# Patient Record
Sex: MALE | Race: ASIAN | Employment: OTHER | URBAN - METROPOLITAN AREA
[De-identification: names, ages, dates, MRNs, and addresses within clinical notes are randomized per-mention and may not be internally consistent; named-entity substitution may affect disease eponyms.]

---

## 2022-12-16 ENCOUNTER — APPOINTMENT (OUTPATIENT)
Dept: GENERAL RADIOLOGY | Facility: HOSPITAL | Age: 77
End: 2022-12-16
Attending: EMERGENCY MEDICINE
Payer: MEDICAID

## 2022-12-16 ENCOUNTER — APPOINTMENT (OUTPATIENT)
Dept: INTERVENTIONAL RADIOLOGY/VASCULAR | Facility: HOSPITAL | Age: 77
End: 2022-12-16
Attending: INTERNAL MEDICINE
Payer: MEDICAID

## 2022-12-16 ENCOUNTER — APPOINTMENT (OUTPATIENT)
Dept: CV DIAGNOSTICS | Facility: HOSPITAL | Age: 77
End: 2022-12-16
Attending: INTERNAL MEDICINE
Payer: MEDICAID

## 2022-12-16 ENCOUNTER — HOSPITAL ENCOUNTER (INPATIENT)
Facility: HOSPITAL | Age: 77
LOS: 2 days | Discharge: HOME | End: 2022-12-18
Attending: EMERGENCY MEDICINE | Admitting: HOSPITALIST
Payer: MEDICAID

## 2022-12-16 DIAGNOSIS — I21.3 ST ELEVATION MYOCARDIAL INFARCTION (STEMI), UNSPECIFIED ARTERY (HCC): ICD-10-CM

## 2022-12-16 DIAGNOSIS — I44.2 THIRD DEGREE HEART BLOCK (HCC): Primary | ICD-10-CM

## 2022-12-16 LAB
ALBUMIN SERPL-MCNC: 3.9 G/DL (ref 3.4–5)
ALBUMIN/GLOB SERPL: 1.1 {RATIO} (ref 1–2)
ALP LIVER SERPL-CCNC: 57 U/L
ALT SERPL-CCNC: 67 U/L
ANION GAP SERPL CALC-SCNC: 6 MMOL/L (ref 0–18)
APTT PPP: 28.8 SECONDS (ref 23.3–35.6)
AST SERPL-CCNC: 65 U/L (ref 15–37)
ATRIAL RATE: 60 BPM
ATRIAL RATE: 80 BPM
ATRIAL RATE: 92 BPM
BASOPHILS # BLD AUTO: 0.06 X10(3) UL (ref 0–0.2)
BASOPHILS NFR BLD AUTO: 0.5 %
BILIRUB SERPL-MCNC: 0.3 MG/DL (ref 0.1–2)
BUN BLD-MCNC: 20 MG/DL (ref 7–18)
CALCIUM BLD-MCNC: 9 MG/DL (ref 8.5–10.1)
CHLORIDE SERPL-SCNC: 107 MMOL/L (ref 98–112)
CHOLEST SERPL-MCNC: 210 MG/DL (ref ?–200)
CO2 SERPL-SCNC: 27 MMOL/L (ref 21–32)
CREAT BLD-MCNC: 1.4 MG/DL
EOSINOPHIL # BLD AUTO: 0.29 X10(3) UL (ref 0–0.7)
EOSINOPHIL NFR BLD AUTO: 2.3 %
ERYTHROCYTE [DISTWIDTH] IN BLOOD BY AUTOMATED COUNT: 13.2 %
EST. AVERAGE GLUCOSE BLD GHB EST-MCNC: 134 MG/DL (ref 68–126)
GFR SERPLBLD BASED ON 1.73 SQ M-ARVRAT: 52 ML/MIN/1.73M2 (ref 60–?)
GLOBULIN PLAS-MCNC: 3.7 G/DL (ref 2.8–4.4)
GLUCOSE BLD-MCNC: 111 MG/DL (ref 70–99)
GLUCOSE BLD-MCNC: 186 MG/DL (ref 70–99)
GLUCOSE BLD-MCNC: 94 MG/DL (ref 70–99)
GLUCOSE BLD-MCNC: 98 MG/DL (ref 70–99)
HBA1C MFR BLD: 6.3 % (ref ?–5.7)
HCT VFR BLD AUTO: 41.7 %
HDLC SERPL-MCNC: 34 MG/DL (ref 40–59)
HGB BLD-MCNC: 13.1 G/DL
IMM GRANULOCYTES # BLD AUTO: 0.03 X10(3) UL (ref 0–1)
IMM GRANULOCYTES NFR BLD: 0.2 %
ISTAT ACTIVATED CLOTTING TIME: 257 SECONDS (ref 74–137)
LDLC SERPL CALC-MCNC: 146 MG/DL (ref ?–100)
LYMPHOCYTES # BLD AUTO: 4.12 X10(3) UL (ref 1–4)
LYMPHOCYTES NFR BLD AUTO: 32.6 %
MAGNESIUM SERPL-MCNC: 2.4 MG/DL (ref 1.6–2.6)
MCH RBC QN AUTO: 28.3 PG (ref 26–34)
MCHC RBC AUTO-ENTMCNC: 31.4 G/DL (ref 31–37)
MCV RBC AUTO: 90.1 FL
MONOCYTES # BLD AUTO: 0.77 X10(3) UL (ref 0.1–1)
MONOCYTES NFR BLD AUTO: 6.1 %
NEUTROPHILS # BLD AUTO: 7.38 X10 (3) UL (ref 1.5–7.7)
NEUTROPHILS # BLD AUTO: 7.38 X10(3) UL (ref 1.5–7.7)
NEUTROPHILS NFR BLD AUTO: 58.3 %
NONHDLC SERPL-MCNC: 176 MG/DL (ref ?–130)
NT-PROBNP SERPL-MCNC: 185 PG/ML (ref ?–450)
OSMOLALITY SERPL CALC.SUM OF ELEC: 297 MOSM/KG (ref 275–295)
P AXIS: 23 DEGREES
P AXIS: 56 DEGREES
P AXIS: 6 DEGREES
P-R INTERVAL: 292 MS
P-R INTERVAL: 378 MS
PLATELET # BLD AUTO: 236 10(3)UL (ref 150–450)
POTASSIUM SERPL-SCNC: 3.8 MMOL/L (ref 3.5–5.1)
PROT SERPL-MCNC: 7.6 G/DL (ref 6.4–8.2)
Q-T INTERVAL: 374 MS
Q-T INTERVAL: 418 MS
Q-T INTERVAL: 508 MS
QRS DURATION: 86 MS
QRS DURATION: 94 MS
QRS DURATION: 96 MS
QTC CALCULATION (BEZET): 387 MS
QTC CALCULATION (BEZET): 418 MS
QTC CALCULATION (BEZET): 431 MS
R AXIS: 17 DEGREES
R AXIS: 42 DEGREES
R AXIS: 58 DEGREES
RBC # BLD AUTO: 4.63 X10(6)UL
SARS-COV-2 RNA RESP QL NAA+PROBE: NOT DETECTED
SODIUM SERPL-SCNC: 140 MMOL/L (ref 136–145)
T AXIS: 114 DEGREES
T AXIS: 4 DEGREES
T AXIS: 80 DEGREES
TRIGL SERPL-MCNC: 166 MG/DL (ref 30–149)
TROPONIN I HIGH SENSITIVITY: 906 NG/L
VENTRICULAR RATE: 35 BPM
VENTRICULAR RATE: 60 BPM
VENTRICULAR RATE: 80 BPM
VLDLC SERPL CALC-MCNC: 31 MG/DL (ref 0–30)
WBC # BLD AUTO: 12.7 X10(3) UL (ref 4–11)

## 2022-12-16 PROCEDURE — B211YZZ FLUOROSCOPY OF MULTIPLE CORONARY ARTERIES USING OTHER CONTRAST: ICD-10-PCS | Performed by: INTERNAL MEDICINE

## 2022-12-16 PROCEDURE — 4A023N7 MEASUREMENT OF CARDIAC SAMPLING AND PRESSURE, LEFT HEART, PERCUTANEOUS APPROACH: ICD-10-PCS | Performed by: INTERNAL MEDICINE

## 2022-12-16 PROCEDURE — 93306 TTE W/DOPPLER COMPLETE: CPT | Performed by: INTERNAL MEDICINE

## 2022-12-16 PROCEDURE — 5A1223Z PERFORMANCE OF CARDIAC PACING, CONTINUOUS: ICD-10-PCS | Performed by: INTERNAL MEDICINE

## 2022-12-16 PROCEDURE — B41FYZZ FLUOROSCOPY OF RIGHT LOWER EXTREMITY ARTERIES USING OTHER CONTRAST: ICD-10-PCS | Performed by: INTERNAL MEDICINE

## 2022-12-16 PROCEDURE — 3E033XZ INTRODUCTION OF VASOPRESSOR INTO PERIPHERAL VEIN, PERCUTANEOUS APPROACH: ICD-10-PCS | Performed by: INTERNAL MEDICINE

## 2022-12-16 PROCEDURE — B240ZZ3 ULTRASONOGRAPHY OF SINGLE CORONARY ARTERY, INTRAVASCULAR: ICD-10-PCS | Performed by: INTERNAL MEDICINE

## 2022-12-16 PROCEDURE — 99223 1ST HOSP IP/OBS HIGH 75: CPT | Performed by: HOSPITALIST

## 2022-12-16 PROCEDURE — 027034Z DILATION OF CORONARY ARTERY, ONE ARTERY WITH DRUG-ELUTING INTRALUMINAL DEVICE, PERCUTANEOUS APPROACH: ICD-10-PCS | Performed by: INTERNAL MEDICINE

## 2022-12-16 PROCEDURE — 3E033PZ INTRODUCTION OF PLATELET INHIBITOR INTO PERIPHERAL VEIN, PERCUTANEOUS APPROACH: ICD-10-PCS | Performed by: INTERNAL MEDICINE

## 2022-12-16 PROCEDURE — 3E073GC INTRODUCTION OF OTHER THERAPEUTIC SUBSTANCE INTO CORONARY ARTERY, PERCUTANEOUS APPROACH: ICD-10-PCS | Performed by: INTERNAL MEDICINE

## 2022-12-16 PROCEDURE — 71045 X-RAY EXAM CHEST 1 VIEW: CPT | Performed by: EMERGENCY MEDICINE

## 2022-12-16 PROCEDURE — 02C03ZZ EXTIRPATION OF MATTER FROM CORONARY ARTERY, ONE ARTERY, PERCUTANEOUS APPROACH: ICD-10-PCS | Performed by: INTERNAL MEDICINE

## 2022-12-16 RX ORDER — ASPIRIN 81 MG/1
81 TABLET ORAL DAILY
Status: DISCONTINUED | OUTPATIENT
Start: 2022-12-17 | End: 2022-12-18

## 2022-12-16 RX ORDER — HEPARIN SODIUM 5000 [USP'U]/ML
INJECTION, SOLUTION INTRAVENOUS; SUBCUTANEOUS
Status: COMPLETED
Start: 2022-12-16 | End: 2022-12-16

## 2022-12-16 RX ORDER — MORPHINE SULFATE 2 MG/ML
1 INJECTION, SOLUTION INTRAMUSCULAR; INTRAVENOUS EVERY 2 HOUR PRN
Status: DISCONTINUED | OUTPATIENT
Start: 2022-12-16 | End: 2022-12-18

## 2022-12-16 RX ORDER — ATORVASTATIN CALCIUM 20 MG/1
20 TABLET, FILM COATED ORAL NIGHTLY
COMMUNITY
End: 2022-12-18

## 2022-12-16 RX ORDER — NICOTINE POLACRILEX 4 MG
15 LOZENGE BUCCAL
Status: DISCONTINUED | OUTPATIENT
Start: 2022-12-16 | End: 2022-12-18

## 2022-12-16 RX ORDER — NICOTINE POLACRILEX 4 MG
30 LOZENGE BUCCAL
Status: DISCONTINUED | OUTPATIENT
Start: 2022-12-16 | End: 2022-12-18

## 2022-12-16 RX ORDER — ASPIRIN 81 MG/1
324 TABLET, CHEWABLE ORAL ONCE
Status: COMPLETED | OUTPATIENT
Start: 2022-12-16 | End: 2022-12-16

## 2022-12-16 RX ORDER — MORPHINE SULFATE 4 MG/ML
4 INJECTION, SOLUTION INTRAMUSCULAR; INTRAVENOUS EVERY 2 HOUR PRN
Status: DISCONTINUED | OUTPATIENT
Start: 2022-12-16 | End: 2022-12-18

## 2022-12-16 RX ORDER — POTASSIUM CHLORIDE 20 MEQ/1
40 TABLET, EXTENDED RELEASE ORAL ONCE
Status: COMPLETED | OUTPATIENT
Start: 2022-12-16 | End: 2022-12-16

## 2022-12-16 RX ORDER — DEXTROSE MONOHYDRATE 25 G/50ML
50 INJECTION, SOLUTION INTRAVENOUS
Status: DISCONTINUED | OUTPATIENT
Start: 2022-12-16 | End: 2022-12-18

## 2022-12-16 RX ORDER — MELATONIN
3 NIGHTLY PRN
Status: DISCONTINUED | OUTPATIENT
Start: 2022-12-16 | End: 2022-12-18

## 2022-12-16 RX ORDER — LIDOCAINE HYDROCHLORIDE 10 MG/ML
INJECTION, SOLUTION EPIDURAL; INFILTRATION; INTRACAUDAL; PERINEURAL
Status: COMPLETED
Start: 2022-12-16 | End: 2022-12-16

## 2022-12-16 RX ORDER — ADENOSINE 3 MG/ML
INJECTION, SOLUTION INTRAVENOUS
Status: COMPLETED
Start: 2022-12-16 | End: 2022-12-16

## 2022-12-16 RX ORDER — ACETAMINOPHEN 500 MG
500 TABLET ORAL EVERY 6 HOURS PRN
COMMUNITY

## 2022-12-16 RX ORDER — HEPARIN SODIUM 5000 [USP'U]/ML
5000 INJECTION, SOLUTION INTRAVENOUS; SUBCUTANEOUS EVERY 12 HOURS SCHEDULED
Status: DISCONTINUED | OUTPATIENT
Start: 2022-12-16 | End: 2022-12-18

## 2022-12-16 RX ORDER — WATER 1000 ML/1000ML
INJECTION, SOLUTION INTRAVENOUS
Status: COMPLETED
Start: 2022-12-16 | End: 2022-12-16

## 2022-12-16 RX ORDER — HEPARIN SODIUM AND DEXTROSE 10000; 5 [USP'U]/100ML; G/100ML
12 INJECTION INTRAVENOUS ONCE
Status: COMPLETED | OUTPATIENT
Start: 2022-12-16 | End: 2022-12-16

## 2022-12-16 RX ORDER — HEPARIN SODIUM AND DEXTROSE 10000; 5 [USP'U]/100ML; G/100ML
INJECTION INTRAVENOUS CONTINUOUS
Status: DISCONTINUED | OUTPATIENT
Start: 2022-12-16 | End: 2022-12-16 | Stop reason: ALTCHOICE

## 2022-12-16 RX ORDER — MIDAZOLAM HYDROCHLORIDE 1 MG/ML
INJECTION INTRAMUSCULAR; INTRAVENOUS
Status: COMPLETED
Start: 2022-12-16 | End: 2022-12-16

## 2022-12-16 RX ORDER — ATORVASTATIN CALCIUM 40 MG/1
40 TABLET, FILM COATED ORAL NIGHTLY
Status: DISCONTINUED | OUTPATIENT
Start: 2022-12-16 | End: 2022-12-18

## 2022-12-16 RX ORDER — ASPIRIN 81 MG/1
81 TABLET ORAL DAILY
COMMUNITY

## 2022-12-16 RX ORDER — NOREPINEPHRINE BITARTRATE 1 MG/ML
INJECTION, SOLUTION INTRAVENOUS
Status: COMPLETED
Start: 2022-12-16 | End: 2022-12-16

## 2022-12-16 RX ORDER — METOCLOPRAMIDE HYDROCHLORIDE 5 MG/ML
10 INJECTION INTRAMUSCULAR; INTRAVENOUS EVERY 8 HOURS PRN
Status: DISCONTINUED | OUTPATIENT
Start: 2022-12-16 | End: 2022-12-18

## 2022-12-16 RX ORDER — ONDANSETRON 2 MG/ML
4 INJECTION INTRAMUSCULAR; INTRAVENOUS EVERY 6 HOURS PRN
Status: DISCONTINUED | OUTPATIENT
Start: 2022-12-16 | End: 2022-12-18

## 2022-12-16 RX ORDER — IODIXANOL 320 MG/ML
100 INJECTION, SOLUTION INTRAVASCULAR
Status: COMPLETED | OUTPATIENT
Start: 2022-12-16 | End: 2022-12-16

## 2022-12-16 RX ORDER — HEPARIN SODIUM 1000 [USP'U]/ML
60 INJECTION, SOLUTION INTRAVENOUS; SUBCUTANEOUS ONCE
Status: COMPLETED | OUTPATIENT
Start: 2022-12-16 | End: 2022-12-16

## 2022-12-16 RX ORDER — SODIUM CHLORIDE 9 MG/ML
INJECTION, SOLUTION INTRAVENOUS CONTINUOUS
Status: DISCONTINUED | OUTPATIENT
Start: 2022-12-16 | End: 2022-12-16

## 2022-12-16 RX ORDER — MORPHINE SULFATE 2 MG/ML
2 INJECTION, SOLUTION INTRAMUSCULAR; INTRAVENOUS EVERY 2 HOUR PRN
Status: DISCONTINUED | OUTPATIENT
Start: 2022-12-16 | End: 2022-12-18

## 2022-12-16 RX ORDER — SODIUM CHLORIDE 9 MG/ML
INJECTION, SOLUTION INTRAVENOUS CONTINUOUS
Status: ACTIVE | OUTPATIENT
Start: 2022-12-16 | End: 2022-12-16

## 2022-12-16 RX ORDER — ACETAMINOPHEN 500 MG
500 TABLET ORAL EVERY 4 HOURS PRN
Status: DISCONTINUED | OUTPATIENT
Start: 2022-12-16 | End: 2022-12-18

## 2022-12-16 RX ORDER — NITROGLYCERIN 20 MG/100ML
INJECTION INTRAVENOUS
Status: COMPLETED
Start: 2022-12-16 | End: 2022-12-16

## 2022-12-16 NOTE — PROGRESS NOTES
12/16/22 1000   Clinical Encounter Type   Visited With Health care provider;Patient and family together   Routine Visit Introduction   Continue Visiting Yes  (come back later in day)   Patient's Supportive Strategies/Resources Family   Patient Spiritual Encounters   Spiritual Assessment Completed No   Family Spiritual Encounters   Family Coping Accepting   Family Participation in [place] [address] During Treatment Consistently   Taxonomy   Intended Effects Aligning care plan with patient's values   Methods Collaborate with care team member;Offer support   Interventions Acknowledge current situation; Acknowledge response to difficult experience; Ask guided questions;Assist someone with Advance Directives     Discussion:   received [place] Consult for a POA/Living Will. Prior to visit, reviewed the patient's EMR and paper chart to identify any existing POA documentation. No documentation was found.  consulted patient's RN Sami Mortensen) who verified patient is decisional. RN reported that patient speaks minimal English.  met with patient and daughter. Patient seemed tired.  explained POA paperwork to patient's daughter who asked if  would return later in day after patient's son arrives. Translation will be required to complete POA with patient. Spiritual Care can be requested via an Epic consult.      July Mock M.Div, Chaplain Resident  Ext. [phone]

## 2022-12-16 NOTE — PROGRESS NOTES
12/16/22 2535   Clinical Encounter Type   Visited With Family   Routine Visit Follow-up     Discussion:   followed up with patient's family. Patient was resting and daughter was present. She said that the rest of her family had not arrived yet.  was unable to complete POA at this time. When the family has time discuss POA with the patient, Spiritual Care can be paged (ext. [phone]) to finish the paperwork. Spiritual Care support can be requested via an [facility] consult.      Margie Hager M.Div, Chaplain Resident  Ext. [phone]

## 2022-12-16 NOTE — PROGRESS NOTES
12/16/22 0824   Clinical Encounter Type   Visited With Patient   Taxonomy   Intended Effects Promote a sense of peace   Methods Offer support   Interventions Silent prayer; Acknowledge current situation      attempted visit with patient. Patient in bed sleeping. Staff at bedside.  remains available for ongoing support. Spiritual Care can be requested via an [facility] consult or by pager at [address] [place] [address]. JOSÉ ANTONIO Mujica Div  Extension: [phone]

## 2022-12-16 NOTE — ED INITIAL ASSESSMENT (HPI)
79YM c/c of fall Pt family state that reports that they heard a fall Pt sate that he was heading to the bathroom and fall Pt state that he feeling weak and vomited

## 2022-12-16 NOTE — PROCEDURES
[facility]    Cardiac Cath Procedure Note    [place]  Patient Status:  Emergency    10/8/1945 MRN [id number]   Location [address] [facility] Attending Adrian Saenz MD   Hosp Day # 0 PCP Unknown Pcp       Cardiologist: Xin Parker MD  Primary Proceduralist: Xin Parker MD  Procedure Performed: LHC and IVUS, mechanical thrombectomy and PCI circumflex. Temporary placement  Date of Procedure: 2022   Indication: ST elevation MI    Summary of procedure:    Successful aspiration thrombectomy, PCI, IVUS dominant circumflex with ANNA x1. Temporary pacemaking wire placed      Left Ventriculography and hemodynamics:   Aortic valve not crossed      Coronary Angiography  RCA: Nondominant, small, supplies small RV marginal branches. Left main:  Free of obstructive disease    Left anterior descending: Mid LAD stent with moderate in-stent restenosis, 30 to 40%. Diagonal with nonobstructive disease as well. Circumflex: Dominant system, proximal hazy 99% stenosis with LUPE I flow. Supplies lateral wall marginal as well as inferior and inferolateral branches. Ramus: Large ramus, nonobstructive      Circumflex intervention  Lesion Characteristics-not torturous, not calcified. Type non-C lesion. Pre-intervention stenosis 99%, Post intervention stenosis 0%. Pre LUPE 1, Post LUPE 3.      Guide Catheter: EBU 3.75  Wire: Run through   Pre-dil: 3 x 20 balloon  Stent: 3.5 x 20 mm Synergy ANNA  No reflow, 100% thrombosis just distal to the stent, complete heart block and bradycardia and hypotension.   IV fluids opened up and Levophed started  Penumbra aspiration thrombectomy, multiple runs  Perforated 3 mm balloon to deliver 200 mcg nitroglycerin intracoronary distal to the stenosis with improvement in distal runoff and flow  Intravascular ultrasound demonstrates mildly underexpanded stent, layering thrombus  Penumbra aspiration thrombectomy repeated  Post-dil: 4 x 15 mm compliant balloon 14 abdirahman  No reflow after post dilation  Perforated infusion balloon advanced back into the distal circumflex, 300 mcg intracoronary nitroglycerin given with improvement in distal runoff    Assessment:  ST elevation MI: Status post PCI circumflex, resulting hypotension and heart block mildly improving to the end of the case. CAD: Status post PCI circumflex as above, old LAD stent with moderate in-stent restenosis but nonobstructive. Unknown EF: LV gram not done due to CKD, will have echo this morning    Hypertension, hyperlipidemia      Recommendations:  Echocardiogram ASAP this morning  [place]or bridge to Brilinta  Wean Levophed as tolerated  Temporary pacemaking wire placed, continue telemetry monitoring likely removal later today  High-dose statin      Description of Procedure:   After written informed consent was obtained from the patient, patient was brought to the cardiac catheterization laboratory. Patient was prepped and draped in the usual sterile fashion. Lidocaine 1% was used to infiltrate the right groin for local anesthesia and a 6 [place]an introducer sheath was inserted into the right femoral artery via ultrasound guidance and micropuncture kit. 7 [place]an sheath placed in the right femoral vein for temporary pacemaking wire, pacemaking wire advanced under fluoroscopic guidance, thresholds tested and sheath and wire secured to the groin. Selective coronary angiography performed with JR4 catheter for RCA and JL4 catheter for LCA. Angiography performed in standard projections. Selective right femoral angiogram done assess anatomy for closure. Specimen sent to: No specimen collected  Estimated blood loss: 10 cc  Closure:  Perclose       IV was maintained by RN and moderate conscious sedation of versed and fentanyl was given. Patient was assessed and monitoring of oxygen, heart rate and blood pressure by nurse and myself during the exam 60 minutes.       Yunior So MD  12/16/22

## 2022-12-16 NOTE — PLAN OF CARE
Received patient from cath lab at approximately [facility] 20. Right groin venous sheath in place with temporary pacer. Arterial sheath removed & perclosed. Site soft & intact. Pedal pulse palpable. No complaints of chest pain. Received on 10mcg of levophed able to wean to 4mcg this morning. Patient and family at bedside updated on plan of care.

## 2022-12-16 NOTE — CM/SW NOTE
CM notified by RN that pt's daughter, Salvador Zarate ([phone]), was interested in speaking with a CM/SW regarding insurance. Call placed to daughter and spent time answering her questions. Daughter appreciative of time and information. CM/SW to remain available for discharge planning.     JAGRUTI SandovalN, RN-BC    [id number]

## 2022-12-16 NOTE — PROGRESS NOTES
Seen and examined. Films reviewed. Reviewed with Dr. Kathia Westbrook - discussed with nursing staff, patient and family    Looks and feels well    Tele sinus - no evidence of heart block    Breathing comfortably supine    100/63  68 regular - weaning IV levophed    Lungs clear  Ht RRR - soft LESLIE  abd soft  Ext no bleeding from groin site - temp PM in place via right CFV  Neuro intact    ECG pending    Echo pending      A/P: Day #0 s/p Acute inferolateral infarction (occlusion of dominant LCX) - s/p PTCA/ANNA - presentation with symptomatic complete heart block and cardiogenic shock - now seemingly resolved    IV fluids for additional 10 hours    Plan to remove temporary pacer at 1:00 pm if no further heart block    ECG now    Continue to wean levophed    Aspirin/Brillinta/statin alone for now    May be up to chair later today if all goes well.       CCT:  8:50 - 9:40 am

## 2022-12-16 NOTE — DIETARY NOTE
Clinical Nutrition     Dietitian consult received per cardiac rehab standing order. Pt to be educated by cardiac rehab staff and encouraged to attend outpatient classes taught by ANGELA. RD available PRN.     Jose Luis Fair RD, LDN, [address]   Clinical Dietitian  Phone [phone]

## 2022-12-17 LAB
ALBUMIN SERPL-MCNC: 2.9 G/DL (ref 3.4–5)
ALBUMIN/GLOB SERPL: 0.9 {RATIO} (ref 1–2)
ALP LIVER SERPL-CCNC: 46 U/L
ALT SERPL-CCNC: 69 U/L
ANION GAP SERPL CALC-SCNC: 7 MMOL/L (ref 0–18)
AST SERPL-CCNC: 219 U/L (ref 15–37)
BASOPHILS # BLD AUTO: 0.03 X10(3) UL (ref 0–0.2)
BASOPHILS NFR BLD AUTO: 0.4 %
BILIRUB SERPL-MCNC: 0.7 MG/DL (ref 0.1–2)
BUN BLD-MCNC: 16 MG/DL (ref 7–18)
CALCIUM BLD-MCNC: 8.4 MG/DL (ref 8.5–10.1)
CHLORIDE SERPL-SCNC: 113 MMOL/L (ref 98–112)
CO2 SERPL-SCNC: 22 MMOL/L (ref 21–32)
CREAT BLD-MCNC: 1.04 MG/DL
EOSINOPHIL # BLD AUTO: 0.1 X10(3) UL (ref 0–0.7)
EOSINOPHIL NFR BLD AUTO: 1.2 %
ERYTHROCYTE [DISTWIDTH] IN BLOOD BY AUTOMATED COUNT: 13.3 %
GFR SERPLBLD BASED ON 1.73 SQ M-ARVRAT: 74 ML/MIN/1.73M2 (ref 60–?)
GLOBULIN PLAS-MCNC: 3.3 G/DL (ref 2.8–4.4)
GLUCOSE BLD-MCNC: 105 MG/DL (ref 70–99)
GLUCOSE BLD-MCNC: 109 MG/DL (ref 70–99)
GLUCOSE BLD-MCNC: 113 MG/DL (ref 70–99)
HCT VFR BLD AUTO: 36.5 %
HGB BLD-MCNC: 11.5 G/DL
IMM GRANULOCYTES # BLD AUTO: 0.03 X10(3) UL (ref 0–1)
IMM GRANULOCYTES NFR BLD: 0.4 %
LYMPHOCYTES # BLD AUTO: 2.4 X10(3) UL (ref 1–4)
LYMPHOCYTES NFR BLD AUTO: 28.3 %
MAGNESIUM SERPL-MCNC: 2.1 MG/DL (ref 1.6–2.6)
MCH RBC QN AUTO: 28.1 PG (ref 26–34)
MCHC RBC AUTO-ENTMCNC: 31.5 G/DL (ref 31–37)
MCV RBC AUTO: 89.2 FL
MONOCYTES # BLD AUTO: 0.87 X10(3) UL (ref 0.1–1)
MONOCYTES NFR BLD AUTO: 10.3 %
NEUTROPHILS # BLD AUTO: 5.04 X10 (3) UL (ref 1.5–7.7)
NEUTROPHILS # BLD AUTO: 5.04 X10(3) UL (ref 1.5–7.7)
NEUTROPHILS NFR BLD AUTO: 59.4 %
OSMOLALITY SERPL CALC.SUM OF ELEC: 296 MOSM/KG (ref 275–295)
PLATELET # BLD AUTO: 196 10(3)UL (ref 150–450)
POTASSIUM SERPL-SCNC: 4.2 MMOL/L (ref 3.5–5.1)
POTASSIUM SERPL-SCNC: 4.2 MMOL/L (ref 3.5–5.1)
PROT SERPL-MCNC: 6.2 G/DL (ref 6.4–8.2)
RBC # BLD AUTO: 4.09 X10(6)UL
SODIUM SERPL-SCNC: 142 MMOL/L (ref 136–145)
TROPONIN I HIGH SENSITIVITY: ABNORMAL NG/L
WBC # BLD AUTO: 8.5 X10(3) UL (ref 4–11)

## 2022-12-17 PROCEDURE — 99232 SBSQ HOSP IP/OBS MODERATE 35: CPT | Performed by: HOSPITALIST

## 2022-12-17 NOTE — CARDIAC REHAB
MI/CAD/stent education completed with pt and his son as well as his daughter via [employer]. Offered/encouraged phase 2 CR; contact info for [facility] given to pt.

## 2022-12-17 NOTE — DISCHARGE INSTRUCTIONS
Call soon to get scheduled for phase 2 Cardiac Rehab, the outpatient monitored exercise program.  You will need to follow up with your cardiologist and obtain an order for phase 2 Cardiac Rehab.    Tyler [facility] Cardiac Rehab dept: [phone]

## 2022-12-17 NOTE — PLAN OF CARE
Pt is admitted w/MI, s/p stent/plasty to Lt Cx. Pt rec'd with temp pacing wire via right fem vein. Back up rate at 40. Levophed infusing at 4mcg/min. Order rec'd to remove venous sheath/pacer. This is done at 1315. Pt remains in NSR-SB w/1st degree AVB. Levophed weaned to 2mcg/min, unable to completely wean off. Pt able to get out of bed and ambulate in hallway. Tolerates fairly well. Feels short of breath w/walk but sats 100%. Pt's family at bedside, updated on pt status and POC.

## 2022-12-17 NOTE — PLAN OF CARE
Received pt at approx 1530. Pt is A&Ox4, no complaints of pain. Pt is on room air, lungs are clear/diminished bilaterally, no coughing. Pt is in NSR, 1 degree AV block, no complaints of CP. He is continent of B&B, active bowel sounds, abdomen non-tender, last BM 12-16. Pt has a R groin incision, dressing in place, soft, no hematoma/drainage. Pt updated with plan of care. Problem: Patient/Family Goals  Goal: Patient/Family Long Term Goal  Description: Patient's Long Term Goal: get better 12-17    Interventions:  - med compliance  - follow ups    - See additional Care Plan goals for specific interventions  Outcome: Progressing  Goal: Patient/Family Short Term Goal  Description: Patient's Short Term Goal: no pain 12-17    Interventions:   - PRN meds  - hot/cold packs  - tell nurse if in pain    - See additional Care Plan goals for specific interventions  Outcome: Progressing     Problem: CARDIOVASCULAR - ADULT  Goal: Maintains optimal cardiac output and hemodynamic stability  Description: INTERVENTIONS:  - Monitor vital signs, rhythm, and trends  - Monitor for bleeding, hypotension and signs of decreased cardiac output  - Evaluate effectiveness of vasoactive medications to optimize hemodynamic stability  - Monitor arterial and/or venous puncture sites for bleeding and/or hematoma  - Assess quality of pulses, skin color and temperature  - Assess for signs of decreased coronary artery perfusion - ex.  Angina  - Evaluate fluid balance, assess for edema, trend weights  Outcome: Progressing  Goal: Absence of cardiac arrhythmias or at baseline  Description: INTERVENTIONS:  - Continuous cardiac monitoring, monitor vital signs, obtain 12 lead EKG if indicated  - Evaluate effectiveness of antiarrhythmic and heart rate control medications as ordered  - Initiate emergency measures for life threatening arrhythmias  - Monitor electrolytes and administer replacement therapy as ordered  Outcome: Progressing     Problem: Diabetes/Glucose Control  Goal: Glucose maintained within prescribed range  Description: INTERVENTIONS:  - Monitor Blood Glucose as ordered  - Assess for signs and symptoms of hyperglycemia and hypoglycemia  - Administer ordered medications to maintain glucose within target range  - Assess barriers to adequate nutritional intake and initiate nutrition consult as needed  - Instruct patient on self management of diabetes  Outcome: Progressing

## 2022-12-17 NOTE — PLAN OF CARE
NURSING NOTES [id number]:  0800: Pt received AOx4. Room air. SB. Saline lock. Pt's son at bedside. Denies pain. 1200: Pt ambulated in the hallway by PT and OT. Rehab person came. Pt tired and sleepy, pt stated he did not sleep well last night. 1400: Pt has no appetite and only had a banana. BS-113. VSS. BP stable. 1450: Report given to [facility]. Pt will transfer to [address] [place] [address] 2607. Pt's family awared. 1515: Pt transferred to room 2607 with all his belongings. Problem: CARDIOVASCULAR - ADULT  Goal: Maintains optimal cardiac output and hemodynamic stability  Description: INTERVENTIONS:  - Monitor vital signs, rhythm, and trends  - Monitor for bleeding, hypotension and signs of decreased cardiac output  - Evaluate effectiveness of vasoactive medications to optimize hemodynamic stability  - Monitor arterial and/or venous puncture sites for bleeding and/or hematoma  - Assess quality of pulses, skin color and temperature  - Assess for signs of decreased coronary artery perfusion - ex.  Angina  - Evaluate fluid balance, assess for edema, trend weights  Outcome: Progressing  Goal: Absence of cardiac arrhythmias or at baseline  Description: INTERVENTIONS:  - Continuous cardiac monitoring, monitor vital signs, obtain 12 lead EKG if indicated  - Evaluate effectiveness of antiarrhythmic and heart rate control medications as ordered  - Initiate emergency measures for life threatening arrhythmias  - Monitor electrolytes and administer replacement therapy as ordered  Outcome: Progressing     Problem: Diabetes/Glucose Control  Goal: Glucose maintained within prescribed range  Description: INTERVENTIONS:  - Monitor Blood Glucose as ordered  - Assess for signs and symptoms of hyperglycemia and hypoglycemia  - Administer ordered medications to maintain glucose within target range  - Assess barriers to adequate nutritional intake and initiate nutrition consult as needed  - Instruct patient on self management of diabetes  Outcome: Progressing

## 2022-12-17 NOTE — PLAN OF CARE
Pt. Remains alert and oriented x4, VSS on RA. Levophed gtt off and BP within paramters. Pt. Denies any pain. Right groin remains clean, dry, and intact with no hematoma and palpable pedal pulses. Pt. Able to ambulate to and from bathroom without complication. Pt. Up in chair this morning. Son at bedside overnight, participating in care.

## 2022-12-18 VITALS
DIASTOLIC BLOOD PRESSURE: 48 MMHG | HEIGHT: 71 IN | BODY MASS INDEX: 23.42 KG/M2 | SYSTOLIC BLOOD PRESSURE: 89 MMHG | HEART RATE: 69 BPM | TEMPERATURE: 99 F | WEIGHT: 167.31 LBS | RESPIRATION RATE: 18 BRPM | OXYGEN SATURATION: 100 %

## 2022-12-18 LAB
GLUCOSE BLD-MCNC: 105 MG/DL (ref 70–99)
GLUCOSE BLD-MCNC: 129 MG/DL (ref 70–99)
GLUCOSE BLD-MCNC: 94 MG/DL (ref 70–99)

## 2022-12-18 PROCEDURE — 99239 HOSP IP/OBS DSCHRG MGMT >30: CPT | Performed by: HOSPITALIST

## 2022-12-18 RX ORDER — ATORVASTATIN CALCIUM 40 MG/1
40 TABLET, FILM COATED ORAL NIGHTLY
Qty: 30 TABLET | Refills: 3 | Status: SHIPPED
Start: 2022-12-18

## 2022-12-18 RX ORDER — ATORVASTATIN CALCIUM 40 MG/1
40 TABLET, FILM COATED ORAL NIGHTLY
Qty: 30 TABLET | Refills: 3 | Status: SHIPPED
Start: 2022-12-18 | End: 2022-12-18

## 2022-12-18 NOTE — PLAN OF CARE
Received pt at approx 0700. Pt is A&Ox4, no complaints of pain. Pt is on room air, lungs are clear/diminished bilaterally, no coughing. Pt is in NSR, 1 degree AV block, no complaints of CP. He is continent of B&B, active bowel sounds, abdomen non-tender, last BM 12-17. Pt has a R groin incision, open to air, soft, no hematoma. Pt updated with plan of care. Approx - notified Dr Chandan Melo that BP's still in high 80's today, aware & ok with discharge. Problem: Patient/Family Goals  Goal: Patient/Family Long Term Goal  Description: Patient's Long Term Goal: get better 12-17  \"Stay healthy\" 12-18    Interventions:  - med compliance  - follow ups    - See additional Care Plan goals for specific interventions  Outcome: Progressing  Goal: Patient/Family Short Term Goal  Description: Patient's Short Term Goal: no pain 12-17  Go home 12-18    Interventions:   - PRN meds  - hot/cold packs  - tell nurse if in pain  - price brilinta, nico    - See additional Care Plan goals for specific interventions  Outcome: Progressing     Problem: CARDIOVASCULAR - ADULT  Goal: Maintains optimal cardiac output and hemodynamic stability  Description: INTERVENTIONS:  - Monitor vital signs, rhythm, and trends  - Monitor for bleeding, hypotension and signs of decreased cardiac output  - Evaluate effectiveness of vasoactive medications to optimize hemodynamic stability  - Monitor arterial and/or venous puncture sites for bleeding and/or hematoma  - Assess quality of pulses, skin color and temperature  - Assess for signs of decreased coronary artery perfusion - ex.  Angina  - Evaluate fluid balance, assess for edema, trend weights  Outcome: Progressing  Goal: Absence of cardiac arrhythmias or at baseline  Description: INTERVENTIONS:  - Continuous cardiac monitoring, monitor vital signs, obtain 12 lead EKG if indicated  - Evaluate effectiveness of antiarrhythmic and heart rate control medications as ordered  - Initiate emergency measures for life threatening arrhythmias  - Monitor electrolytes and administer replacement therapy as ordered  Outcome: Progressing     Problem: Diabetes/Glucose Control  Goal: Glucose maintained within prescribed range  Description: INTERVENTIONS:  - Monitor Blood Glucose as ordered  - Assess for signs and symptoms of hyperglycemia and hypoglycemia  - Administer ordered medications to maintain glucose within target range  - Assess barriers to adequate nutritional intake and initiate nutrition consult as needed  - Instruct patient on self management of diabetes  Outcome: Progressing

## 2022-12-18 NOTE — PROGRESS NOTES
12/17/22 1926   Clinical Encounter Type   Visited With Patient and family together   Routine Visit Introduction   Continue Visiting Yes  (Assist patient family with POA when they call.)   Patient's Supportive Strategies/Resources Family and prayer   Referral From Nurse   Patient Spiritual Encounters   Spiritual Assessment Completed Yes  (Patient and family are observant Muslims utilizing blake as a mean of cope.)   Family Spiritual Encounters   Family Coping Accepting   Family Participation in [address] During Treatment Consistently   Taxonomy   Intended Effects Aligning care plan with patient's values   Methods Accompany someone in their spiritual/Yazdanism practice outside your blake tradition; Offer spiritual/Yazdanism support   Interventions Acknowledge current situation;Provde spiritual/Yazdanism resources;Provide hospitality;Silent prayer; Facilitate advance care planning   Referred by/responded to: Referral from RN    Visited with patient today for POA planning. Visit summary:  visitied with pt and son in patient room. Patient was alert and pleasant. [facility] and son regarding medical power of . Both Pt and son shared that pt daughter Aleta Luis and Corey Santoyo received the documents and were discussing the contents.  met with the daughters near [address] where they stated they would like to complete the form with their father tomorrow.  encouraged them to notify the Spiritual Care office when they are ready to complete the form.  notified MILADYS Chong of the plan. Plan:  Spiritual care will continue upon request to complete POA document.     Camryn Main  12/17/2022

## 2022-12-18 NOTE — PLAN OF CARE
Pt is ok to discharge per primary and consults. Discharge instructions including meds & follow ups given. Patient verbalizes understanding & questions answered. IV removed, tele monitor discontinued, all belongings taken with patient. Pt has a R groin incision, soft, no drainage, open to air. Pt transported off unit via wheelchair to [employer].

## 2022-12-18 NOTE — CM/SW NOTE
JUNIE received order to check [place]. Chart reviewed, pt's preferred pharmacy not listed. JUNIE spoke with RN, RN stated she spoke with pt's family and pt's preferred pharmacy is [facility] on [address] in [place]. JUNIE called [facility] in [facility] [phone], pharmacy is closed and will not open until tomorrow. RN aware. JUNIE received message from RN stated pt's family have selected [facility] pharmacy on [address] in [place]. JUNIE called [facility] [phone] and spoke with pharmacist to obtain fax number. Pharmacist stated fax number for script is [phone]. JUNIE updated RN, RN to fax Brilinta script to [facility]. PAM Cole  Discharge Planner     Addendum: JUNIE received message from RN stating she spoke with [facility] pharmacy and Brilinta co-pay is $0 and medication is out of stock at [facility] on [address]. RN stated pt's Hilaria Chao will be available for p/u at [facility] on [address]. RN updated pt/pt's family. SW will continue to remain available for any additional discharge needs.

## 2022-12-18 NOTE — PLAN OF CARE
Patient is alert & oriented x4. Pt ambulates well, SBA. Breath sounds are clear bilateral. On room air. Normal sinus rhythm w/ 1st deg. No pain reported. Right groin is soft, no hematoma and right pedal pulse is present. No acute events overnight. Bed in low position and call light within reach. Reviewed plan of care and patient verbalizes understanding. Problem: Patient/Family Goals  Goal: Patient/Family Long Term Goal  Description: Patient's Long Term Goal: get better 12-17    Interventions:  - med compliance  - follow ups    - See additional Care Plan goals for specific interventions  Outcome: Progressing  Goal: Patient/Family Short Term Goal  Description: Patient's Short Term Goal: no pain 12-17    Interventions:   - PRN meds  - hot/cold packs  - tell nurse if in pain    - See additional Care Plan goals for specific interventions  Outcome: Progressing     Problem: CARDIOVASCULAR - ADULT  Goal: Maintains optimal cardiac output and hemodynamic stability  Description: INTERVENTIONS:  - Monitor vital signs, rhythm, and trends  - Monitor for bleeding, hypotension and signs of decreased cardiac output  - Evaluate effectiveness of vasoactive medications to optimize hemodynamic stability  - Monitor arterial and/or venous puncture sites for bleeding and/or hematoma  - Assess quality of pulses, skin color and temperature  - Assess for signs of decreased coronary artery perfusion - ex.  Angina  - Evaluate fluid balance, assess for edema, trend weights  Outcome: Progressing  Goal: Absence of cardiac arrhythmias or at baseline  Description: INTERVENTIONS:  - Continuous cardiac monitoring, monitor vital signs, obtain 12 lead EKG if indicated  - Evaluate effectiveness of antiarrhythmic and heart rate control medications as ordered  - Initiate emergency measures for life threatening arrhythmias  - Monitor electrolytes and administer replacement therapy as ordered  Outcome: Progressing     Problem: Diabetes/Glucose Control  Goal: Glucose maintained within prescribed range  Description: INTERVENTIONS:  - Monitor Blood Glucose as ordered  - Assess for signs and symptoms of hyperglycemia and hypoglycemia  - Administer ordered medications to maintain glucose within target range  - Assess barriers to adequate nutritional intake and initiate nutrition consult as needed  - Instruct patient on self management of diabetes  Outcome: Progressing

## 2022-12-18 NOTE — PROGRESS NOTES
Brilinta Script $0 at [facility]. Script sent to [facility] at [facility], [address] [facility]. Pt family updated on price and location of pharmacy. Pts family agreeable to this plan.

## 2022-12-19 ENCOUNTER — PATIENT OUTREACH (OUTPATIENT)
Dept: CASE MANAGEMENT | Age: 77
End: 2022-12-19

## 2022-12-19 DIAGNOSIS — I44.2 THIRD DEGREE HEART BLOCK (HCC): ICD-10-CM

## 2022-12-19 DIAGNOSIS — I21.3 ST ELEVATION MYOCARDIAL INFARCTION (STEMI), UNSPECIFIED ARTERY (HCC): ICD-10-CM

## 2022-12-19 DIAGNOSIS — Z02.9 ENCOUNTERS FOR UNSPECIFIED ADMINISTRATIVE PURPOSE: ICD-10-CM

## 2022-12-19 NOTE — PROGRESS NOTES
Attempted to contact pt for condition update however no answer. Call continued to ring and did not go to a . [facility] to try again at a later time.

## 2022-12-20 ENCOUNTER — PATIENT OUTREACH (OUTPATIENT)
Dept: CASE MANAGEMENT | Age: 77
End: 2022-12-20

## 2022-12-20 NOTE — PROGRESS NOTES
VM received; pt daughter, Xavi Pepper requesting assistance w/scheduling apt (dc 12/18)    Dr Sukhwinder North  [address] [phone]  Follow up 1 week  office will call the pt daughter, Xavi Pepper once Dr Thuy Berry is in the office (Dr Thuy Berry is ooo this week; won't be back until Tue 12/27)  Pt daughter verbalized understanding  Closing encounter

## 2022-12-28 ENCOUNTER — OFFICE VISIT (OUTPATIENT)
Dept: FAMILY MEDICINE CLINIC | Facility: CLINIC | Age: 77
End: 2022-12-28
Payer: MEDICAID

## 2022-12-28 VITALS
RESPIRATION RATE: 16 BRPM | OXYGEN SATURATION: 100 % | HEIGHT: 69.5 IN | TEMPERATURE: 97 F | HEART RATE: 84 BPM | WEIGHT: 167 LBS | DIASTOLIC BLOOD PRESSURE: 60 MMHG | SYSTOLIC BLOOD PRESSURE: 114 MMHG | BODY MASS INDEX: 24.18 KG/M2

## 2022-12-28 DIAGNOSIS — I21.3 ST ELEVATION MYOCARDIAL INFARCTION (STEMI), UNSPECIFIED ARTERY (HCC): Primary | ICD-10-CM

## 2022-12-28 DIAGNOSIS — E78.2 MIXED HYPERLIPIDEMIA: ICD-10-CM

## 2022-12-28 DIAGNOSIS — I10 ESSENTIAL HYPERTENSION: ICD-10-CM

## 2022-12-28 DIAGNOSIS — I44.2 THIRD DEGREE HEART BLOCK (HCC): ICD-10-CM

## 2022-12-28 PROCEDURE — 3074F SYST BP LT 130 MM HG: CPT | Performed by: FAMILY MEDICINE

## 2022-12-28 PROCEDURE — 3078F DIAST BP <80 MM HG: CPT | Performed by: FAMILY MEDICINE

## 2022-12-28 PROCEDURE — 3008F BODY MASS INDEX DOCD: CPT | Performed by: FAMILY MEDICINE

## 2022-12-28 PROCEDURE — 99204 OFFICE O/P NEW MOD 45 MIN: CPT | Performed by: FAMILY MEDICINE

## 2022-12-28 PROCEDURE — 1111F DSCHRG MED/CURRENT MED MERGE: CPT | Performed by: FAMILY MEDICINE

## 2022-12-28 NOTE — PATIENT INSTRUCTIONS
Patient Name: ANEL HOOKER([id number])  Sex: Male  : 10/08/1945    Order Date:2022  Authorizing Provider:Lynn Sequeira  [facility]      Cardiac rehab evaluation Judy Mojganlizett  Order #: [id number]    Priority: Routine  Class: Hospital Performed      Reason for Consult? Post Cardiac Cath Interventions       Released on: 2022  7:58 AM                                                                                        Signature:  ____________________________________    CLINICAL DECISION SUPPORT  Score:   Session ID:   Source:    Identifier:   Adherence:   Brittney Exception:   Comment:

## 2023-03-01 ENCOUNTER — OFFICE VISIT (OUTPATIENT)
Dept: FAMILY MEDICINE CLINIC | Facility: CLINIC | Age: 78
End: 2023-03-01
Payer: MEDICAID

## 2023-03-01 VITALS
WEIGHT: 163 LBS | BODY MASS INDEX: 23.6 KG/M2 | OXYGEN SATURATION: 99 % | RESPIRATION RATE: 16 BRPM | TEMPERATURE: 98 F | DIASTOLIC BLOOD PRESSURE: 62 MMHG | SYSTOLIC BLOOD PRESSURE: 124 MMHG | HEIGHT: 69.5 IN | HEART RATE: 80 BPM

## 2023-03-01 DIAGNOSIS — I21.3 ST ELEVATION MYOCARDIAL INFARCTION (STEMI), UNSPECIFIED ARTERY (HCC): ICD-10-CM

## 2023-03-01 DIAGNOSIS — I10 ESSENTIAL HYPERTENSION: Primary | ICD-10-CM

## 2023-03-01 DIAGNOSIS — R73.01 IFG (IMPAIRED FASTING GLUCOSE): ICD-10-CM

## 2023-03-01 PROBLEM — E78.2 HYPERLIPIDEMIA, MIXED: Status: ACTIVE | Noted: 2023-01-03

## 2023-03-01 PROCEDURE — 3074F SYST BP LT 130 MM HG: CPT | Performed by: FAMILY MEDICINE

## 2023-03-01 PROCEDURE — 3078F DIAST BP <80 MM HG: CPT | Performed by: FAMILY MEDICINE

## 2023-03-01 PROCEDURE — 99214 OFFICE O/P EST MOD 30 MIN: CPT | Performed by: FAMILY MEDICINE

## 2023-03-01 PROCEDURE — 3008F BODY MASS INDEX DOCD: CPT | Performed by: FAMILY MEDICINE

## 2023-03-08 LAB
ABSOLUTE BASOPHILS: 53 CELLS/UL (ref 0–200)
ABSOLUTE EOSINOPHILS: 233 CELLS/UL (ref 15–500)
ABSOLUTE LYMPHOCYTES: 3630 CELLS/UL (ref 850–3900)
ABSOLUTE MONOCYTES: 653 CELLS/UL (ref 200–950)
ABSOLUTE NEUTROPHILS: 2933 CELLS/UL (ref 1500–7800)
ALBUMIN/GLOBULIN RATIO: 1.3 (CALC) (ref 1–2.5)
ALBUMIN: 4.2 G/DL (ref 3.6–5.1)
ALKALINE PHOSPHATASE: 60 U/L (ref 35–144)
ALT: 19 U/L (ref 9–46)
AST: 22 U/L (ref 10–35)
BASOPHILS: 0.7 %
BILIRUBIN, TOTAL: 0.6 MG/DL (ref 0.2–1.2)
BUN: 16 MG/DL (ref 7–25)
CALCIUM: 9.4 MG/DL (ref 8.6–10.3)
CARBON DIOXIDE: 27 MMOL/L (ref 20–32)
CHLORIDE: 105 MMOL/L (ref 98–110)
CHOL/HDLC RATIO: 4.3 (CALC)
CHOLESTEROL, TOTAL: 153 MG/DL
CREATININE: 1.02 MG/DL (ref 0.7–1.28)
EGFR: 76 ML/MIN/1.73M2
EOSINOPHILS: 3.1 %
GLOBULIN: 3.3 G/DL (CALC) (ref 1.9–3.7)
GLUCOSE: 94 MG/DL (ref 65–99)
HDL CHOLESTEROL: 36 MG/DL
HEMATOCRIT: 41.6 % (ref 38.5–50)
HEMOGLOBIN A1C: 5.9 % OF TOTAL HGB
HEMOGLOBIN: 13.4 G/DL (ref 13.2–17.1)
LDL-CHOLESTEROL: 92 MG/DL (CALC)
LYMPHOCYTES: 48.4 %
MCH: 27.4 PG (ref 27–33)
MCHC: 32.2 G/DL (ref 32–36)
MCV: 85.1 FL (ref 80–100)
MONOCYTES: 8.7 %
MPV: 10.4 FL (ref 7.5–12.5)
NEUTROPHILS: 39.1 %
NON-HDL CHOLESTEROL: 117 MG/DL (CALC)
PLATELET COUNT: 266 THOUSAND/UL (ref 140–400)
POTASSIUM: 4.4 MMOL/L (ref 3.5–5.3)
PROTEIN, TOTAL: 7.5 G/DL (ref 6.1–8.1)
RDW: 13.5 % (ref 11–15)
RED BLOOD CELL COUNT: 4.89 MILLION/UL (ref 4.2–5.8)
SODIUM: 140 MMOL/L (ref 135–146)
T4, FREE: 0.9 NG/DL (ref 0.8–1.8)
TRIGLYCERIDES: 156 MG/DL
TSH: 1.3 MIU/L (ref 0.4–4.5)
WHITE BLOOD CELL COUNT: 7.5 THOUSAND/UL (ref 3.8–10.8)

## 2023-03-09 DIAGNOSIS — I10 ESSENTIAL HYPERTENSION: ICD-10-CM

## 2023-03-09 DIAGNOSIS — I21.3 ST ELEVATION MYOCARDIAL INFARCTION (STEMI), UNSPECIFIED ARTERY (HCC): ICD-10-CM

## 2023-03-09 DIAGNOSIS — R73.01 IFG (IMPAIRED FASTING GLUCOSE): Primary | ICD-10-CM

## 2023-10-04 ENCOUNTER — OFFICE VISIT (OUTPATIENT)
Dept: FAMILY MEDICINE CLINIC | Facility: CLINIC | Age: 78
End: 2023-10-04
Payer: MEDICAID

## 2023-10-04 VITALS
OXYGEN SATURATION: 98 % | BODY MASS INDEX: 23.95 KG/M2 | DIASTOLIC BLOOD PRESSURE: 70 MMHG | RESPIRATION RATE: 16 BRPM | HEART RATE: 80 BPM | HEIGHT: 69.5 IN | TEMPERATURE: 98 F | WEIGHT: 165.38 LBS | SYSTOLIC BLOOD PRESSURE: 124 MMHG

## 2023-10-04 DIAGNOSIS — Z00.00 ENCOUNTER FOR ANNUAL PHYSICAL EXAM: Primary | ICD-10-CM

## 2023-10-04 DIAGNOSIS — E78.2 MIXED HYPERLIPIDEMIA: ICD-10-CM

## 2023-10-04 DIAGNOSIS — I25.119 CORONARY ARTERY DISEASE INVOLVING NATIVE HEART WITH ANGINA PECTORIS, UNSPECIFIED VESSEL OR LESION TYPE (HCC): ICD-10-CM

## 2023-10-04 DIAGNOSIS — I10 ESSENTIAL HYPERTENSION: ICD-10-CM

## 2023-10-04 PROCEDURE — 3074F SYST BP LT 130 MM HG: CPT | Performed by: FAMILY MEDICINE

## 2023-10-04 PROCEDURE — 99397 PER PM REEVAL EST PAT 65+ YR: CPT | Performed by: FAMILY MEDICINE

## 2023-10-04 PROCEDURE — 3008F BODY MASS INDEX DOCD: CPT | Performed by: FAMILY MEDICINE

## 2023-10-04 PROCEDURE — 3078F DIAST BP <80 MM HG: CPT | Performed by: FAMILY MEDICINE

## 2023-10-04 RX ORDER — CYPROHEPTADINE HYDROCHLORIDE 2 MG/5ML
2 SOLUTION ORAL NIGHTLY
Qty: 200 ML | Refills: 0 | Status: SHIPPED | OUTPATIENT
Start: 2023-10-04

## 2023-10-04 RX ORDER — CLOPIDOGREL BISULFATE 75 MG/1
TABLET ORAL
COMMUNITY
Start: 2023-05-01

## 2023-10-04 RX ORDER — ESOMEPRAZOLE MAGNESIUM 40 MG/1
FOR SUSPENSION ORAL
COMMUNITY
Start: 2023-08-05

## 2023-10-18 DIAGNOSIS — I21.3 ST ELEVATION MYOCARDIAL INFARCTION (STEMI), UNSPECIFIED ARTERY (HCC): Primary | ICD-10-CM

## 2023-10-18 DIAGNOSIS — D64.9 LOW HEMOGLOBIN: ICD-10-CM

## 2023-10-18 LAB
ABSOLUTE BASOPHILS: 50 CELLS/UL (ref 0–200)
ABSOLUTE EOSINOPHILS: 208 CELLS/UL (ref 15–500)
ABSOLUTE LYMPHOCYTES: 3219 CELLS/UL (ref 850–3900)
ABSOLUTE MONOCYTES: 643 CELLS/UL (ref 200–950)
ABSOLUTE NEUTROPHILS: 2180 CELLS/UL (ref 1500–7800)
ALBUMIN/GLOBULIN RATIO: 1.2 (CALC) (ref 1–2.5)
ALBUMIN: 4.1 G/DL (ref 3.6–5.1)
ALKALINE PHOSPHATASE: 62 U/L (ref 35–144)
ALT: 13 U/L (ref 9–46)
AST: 18 U/L (ref 10–35)
BASOPHILS: 0.8 %
BILIRUBIN, TOTAL: 0.6 MG/DL (ref 0.2–1.2)
BUN: 18 MG/DL (ref 7–25)
CALCIUM: 9.2 MG/DL (ref 8.6–10.3)
CARBON DIOXIDE: 29 MMOL/L (ref 20–32)
CHLORIDE: 104 MMOL/L (ref 98–110)
CHOL/HDLC RATIO: 5.1 (CALC)
CHOLESTEROL, TOTAL: 177 MG/DL
CREATININE: 0.97 MG/DL (ref 0.7–1.28)
EGFR: 80 ML/MIN/1.73M2
EOSINOPHILS: 3.3 %
GLOBULIN: 3.3 G/DL (CALC) (ref 1.9–3.7)
GLUCOSE: 90 MG/DL (ref 65–99)
HDL CHOLESTEROL: 35 MG/DL
HEMATOCRIT: 40.2 % (ref 38.5–50)
HEMOGLOBIN: 12.9 G/DL (ref 13.2–17.1)
LDL-CHOLESTEROL: 115 MG/DL (CALC)
LYMPHOCYTES: 51.1 %
MCH: 27.1 PG (ref 27–33)
MCHC: 32.1 G/DL (ref 32–36)
MCV: 84.5 FL (ref 80–100)
MONOCYTES: 10.2 %
MPV: 10.3 FL (ref 7.5–12.5)
NEUTROPHILS: 34.6 %
NON-HDL CHOLESTEROL: 142 MG/DL (CALC)
PLATELET COUNT: 233 THOUSAND/UL (ref 140–400)
POTASSIUM: 4.4 MMOL/L (ref 3.5–5.3)
PROTEIN, TOTAL: 7.4 G/DL (ref 6.1–8.1)
PSA, TOTAL: 2.12 NG/ML
RDW: 13.9 % (ref 11–15)
RED BLOOD CELL COUNT: 4.76 MILLION/UL (ref 4.2–5.8)
SODIUM: 140 MMOL/L (ref 135–146)
T4, FREE: 1 NG/DL (ref 0.8–1.8)
TRIGLYCERIDES: 152 MG/DL
TSH: 1.92 MIU/L (ref 0.4–4.5)
WHITE BLOOD CELL COUNT: 6.3 THOUSAND/UL (ref 3.8–10.8)

## 2023-10-30 RX ORDER — ESOMEPRAZOLE MAGNESIUM 40 MG/1
40 FOR SUSPENSION ORAL
Qty: 30 EACH | Refills: 3 | Status: SHIPPED | OUTPATIENT
Start: 2023-10-30

## 2023-10-30 NOTE — TELEPHONE ENCOUNTER
Patient comment: Davi Sims is taking this medicine for the past 3 months. Dr. Ron Ruiz had mentioned that Angie King will have to take this medicine until he is taking blood thinners. Please request refill.  Thank you     LOV 10/4/2023    Future Appointments   Date Time Provider Magan Condon   1/4/2024 12:30 PM Naheed England MD [facility]

## 2023-10-31 RX ORDER — ESOMEPRAZOLE MAGNESIUM 40 MG/1
40 CAPSULE, DELAYED RELEASE ORAL
Qty: 90 CAPSULE | Refills: 0 | Status: SHIPPED | OUTPATIENT
Start: 2023-10-31

## 2023-10-31 RX ORDER — ESOMEPRAZOLE MAGNESIUM 40 MG/1
40 FOR SUSPENSION ORAL
Qty: 30 EACH | Refills: 3 | Status: CANCELLED | OUTPATIENT
Start: 2023-10-31

## 2023-10-31 NOTE — TELEPHONE ENCOUNTER
someprazole Magnesium 40 MG Oral Powd Pack [Mars Hoskins]       Patient Comment: Hello, we went to the pharmacy and they said the  esomeprazole is a powder. We need to get tablets for esomeprazole 40 MG. Please advise.

## 2023-11-28 RX ORDER — CLOPIDOGREL BISULFATE 75 MG/1
TABLET ORAL
Refills: 0 | Status: CANCELLED | OUTPATIENT
Start: 2023-11-28

## 2023-12-11 ENCOUNTER — PATIENT MESSAGE (OUTPATIENT)
Dept: FAMILY MEDICINE CLINIC | Facility: CLINIC | Age: 78
End: 2023-12-11

## 2023-12-13 NOTE — TELEPHONE ENCOUNTER
From: Celia Bangura  To: Mars Hoskins  Sent: 12/11/2023 5:20 PM CST  Subject: Vitamin D    Hello,     This msg is for Dr. Mars Hoskins - please advise if Suemichael Bangura should take vitamin D meds?     If yes, please advise which Vitamin OTC should he take?  Thank you

## 2024-01-04 ENCOUNTER — OFFICE VISIT (OUTPATIENT)
Dept: FAMILY MEDICINE CLINIC | Facility: CLINIC | Age: 79
End: 2024-01-04
Payer: MEDICAID

## 2024-01-04 VITALS
DIASTOLIC BLOOD PRESSURE: 70 MMHG | TEMPERATURE: 97 F | RESPIRATION RATE: 18 BRPM | HEART RATE: 79 BPM | SYSTOLIC BLOOD PRESSURE: 122 MMHG | WEIGHT: 171.63 LBS | HEIGHT: 69.5 IN | BODY MASS INDEX: 24.85 KG/M2 | OXYGEN SATURATION: 99 %

## 2024-01-04 DIAGNOSIS — R73.01 IFG (IMPAIRED FASTING GLUCOSE): ICD-10-CM

## 2024-01-04 DIAGNOSIS — I10 ESSENTIAL HYPERTENSION: ICD-10-CM

## 2024-01-04 DIAGNOSIS — I25.119 CORONARY ARTERY DISEASE INVOLVING NATIVE HEART WITH ANGINA PECTORIS, UNSPECIFIED VESSEL OR LESION TYPE (HCC): Primary | ICD-10-CM

## 2024-01-04 PROCEDURE — 3074F SYST BP LT 130 MM HG: CPT | Performed by: FAMILY MEDICINE

## 2024-01-04 PROCEDURE — 99214 OFFICE O/P EST MOD 30 MIN: CPT | Performed by: FAMILY MEDICINE

## 2024-01-04 PROCEDURE — 3008F BODY MASS INDEX DOCD: CPT | Performed by: FAMILY MEDICINE

## 2024-01-04 PROCEDURE — 3078F DIAST BP <80 MM HG: CPT | Performed by: FAMILY MEDICINE

## 2024-01-04 RX ORDER — CLOPIDOGREL BISULFATE 75 MG/1
75 TABLET ORAL DAILY
Qty: 90 TABLET | Refills: 1 | Status: SHIPPED | OUTPATIENT
Start: 2024-01-04

## 2024-01-04 NOTE — PROGRESS NOTES
/70   Pulse 79   Temp 97.4 °F (36.3 °C) (Temporal)   Resp 18   Ht 5' 9.5\" (1.765 m)   Wt 171 lb 9.6 oz (77.8 kg)   SpO2 99%   BMI 24.98 kg/m²               Chief Complaint   Patient presents with    Follow - Up     Pt is here for 3 mos f/u        HPI;    Celia Bangura is a 78 year old male.  Who is here for 3-month follow-up  Patient has history of coronary artery disease, hyperlipidemia as well as anxiety  Patient was advised to follow-up after I had started him on cyproheptadine to help with poor appetite  Patient the medicine for a day then stopped he has been eating well since then  He has gained weight  He denies any complaints today  Needs refills on his Plavix  Review of system is negative      Wt Readings from Last 3 Encounters:   01/04/24 171 lb 9.6 oz (77.8 kg)   10/04/23 165 lb 6 oz (75 kg)   03/01/23 163 lb (73.9 kg)     BP Readings from Last 3 Encounters:   01/04/24 122/70   10/04/23 124/70   03/01/23 124/62         ALLERGIES:  No Known Allergies  Current Outpatient Medications   Medication Sig Dispense Refill    clopidogrel 75 MG Oral Tab Take 1 tablet (75 mg total) by mouth daily. 90 tablet 1    Esomeprazole Magnesium 40 MG Oral Capsule Delayed Release Take 1 capsule (40 mg total) by mouth every morning before breakfast. 90 capsule 0    Esomeprazole Magnesium 40 MG Oral Powd Pack Take 40 mg by mouth every morning before breakfast. 30 each 3    atorvastatin 40 MG Oral Tab Take 1 tablet (40 mg total) by mouth nightly. 90 tablet 0    CHLORDIAZEPOXIDE-CLIDINIUM OR 10 mg.      Multiple Vitamins-Minerals (CENTRUM SILVER ADULT 50+ OR)       B Complex-Folic Acid (SUPER B COMPLEX MAXI) Oral Tab Super B Maxi Complex 0.4 mg tablet, [RxNorm: 0]      Coenzyme Q10 Liposomal 100 MG/ML Oral Liquid Take by mouth.      acetaminophen 500 MG Oral Tab Take 1 tablet (500 mg total) by mouth every 6 (six) hours as needed for Pain.        Past Medical History:   Diagnosis Date    Arthritis     Atherosclerosis of  coronary artery     Cataract     Coronary atherosclerosis     Esophageal reflux     Essential hypertension     High blood pressure     High cholesterol     Hyperlipidemia     Myocardial infarction (HCC) 2001, 2022      Social History:  Social History     Socioeconomic History    Marital status:    Tobacco Use    Smoking status: Never     Passive exposure: Never    Smokeless tobacco: Never   Vaping Use    Vaping Use: Never used   Substance and Sexual Activity    Alcohol use: Never    Drug use: Never   Other Topics Concern    Caffeine Concern No    Exercise Yes     Comment: Can't walk due to hamstring pain    Seat Belt No    Special Diet No    Stress Concern Yes    Weight Concern Yes        REVIEW OF SYSTEMS:   GENERAL HEALTH: feels well otherwise  SKIN: denies any unusual skin lesions or rashes  RESPIRATORY: denies shortness of breath with exertion  CARDIOVASCULAR: denies chest pain on exertion  GI: denies abdominal pain and denies heartburn  NEURO: denies headaches  EXAM:   /70   Pulse 79   Temp 97.4 °F (36.3 °C) (Temporal)   Resp 18   Ht 5' 9.5\" (1.765 m)   Wt 171 lb 9.6 oz (77.8 kg)   SpO2 99%   BMI 24.98 kg/m²   GENERAL: well developed, well nourished,in no apparent distress  SKIN: no rashes,no suspicious lesions  HEENT: atraumatic, normocephalic,ears and throat are clear  NECK: supple,no adenopathy,no bruits  LUNGS: clear to auscultation  CARDIO: RRR without murmur  GI: good BS's,no masses, HSM or tenderness  EXTREMITIES: no cyanosis, clubbing or edema    ASSESSMENT AND PLAN:   Diagnoses and all orders for this visit:    Coronary artery disease involving native heart with angina pectoris, unspecified vessel or lesion type ([facility])  Continue atorvastatin 40 mg daily  Continue Plavix  Refills given  CoQ 10 for muscle aches  Essential hypertension  Under control  Patient not on any medications   IFG (impaired fasting glucose)  A1c, 5.9  Patient will get his next set of labs    Other orders  -      clopidogrel 75 MG Oral Tab; Take 1 tablet (75 mg total) by mouth daily.    Time spent at appointment today is 30 minutes including preparing to see patient, reviewing test results, performing medically appropriate examination and evaluation and coordinating care, counseling and educating patient/family, ordering medications and testing, and documenting clinical information in EMR.     The patient indicates understanding of these issues and agrees to the plan.  Imaging & Consults:  None  Meds & Refills for this Visit:  Requested Prescriptions     Signed Prescriptions Disp Refills    clopidogrel 75 MG Oral Tab 90 tablet 1     Sig: Take 1 tablet (75 mg total) by mouth daily.     No orders of the defined types were placed in this encounter.            Mars Hoskins MD   [facility]  [address] [place] [place] [id number]    Electronically signed    This dictation was performed with a verbal recognition program (DRAGON) and it was checked for errors. It is possible that there are still dictated errors within this office note. If so, please bring any errors to my attention for an addendum. All efforts were made to ensure that this office note is accurate

## 2024-01-17 RX ORDER — ESOMEPRAZOLE MAGNESIUM 40 MG/1
40 CAPSULE, DELAYED RELEASE ORAL
Qty: 90 CAPSULE | Refills: 0 | Status: SHIPPED | OUTPATIENT
Start: 2024-01-17

## 2024-01-17 NOTE — TELEPHONE ENCOUNTER
LOV 1/4/2024    Future Appointments   Date Time Provider Department Center   7/11/2024  1:30 PM Mars Hoskins MD [facility]

## 2024-01-25 RX ORDER — ESOMEPRAZOLE MAGNESIUM 40 MG/1
40 GRANULE, DELAYED RELEASE ORAL
Qty: 30 EACH | Refills: 3 | OUTPATIENT
Start: 2024-01-25

## 2024-01-25 NOTE — TELEPHONE ENCOUNTER
Yes he can take vitamin D, 50,000 units once a week  He should also take Theragran multivitamin once a day

## 2024-02-26 ENCOUNTER — TELEPHONE (OUTPATIENT)
Dept: FAMILY MEDICINE CLINIC | Facility: CLINIC | Age: 79
End: 2024-02-26

## 2024-02-26 NOTE — TELEPHONE ENCOUNTER
MCM sent as requested.  Patient is due for cbc and lipid panel.  Orders have been sen to [facility].

## 2024-02-26 NOTE — TELEPHONE ENCOUNTER
Spoke to pt's daughter, Olena, she would like a reply via [employer] is ok if the pt should have labs done for Lipid panel and CBC.  Once confirmed, pt's daughter will schedule the lab @ [facility] and also schedule a follow-up appointment is early April to see provider -- pending if pt needs to have labs done again.

## 2024-02-28 LAB
ABSOLUTE BASOPHILS: 58 CELLS/UL (ref 0–200)
ABSOLUTE EOSINOPHILS: 413 CELLS/UL (ref 15–500)
ABSOLUTE LYMPHOCYTES: 2045 CELLS/UL (ref 850–3900)
ABSOLUTE MONOCYTES: 826 CELLS/UL (ref 200–950)
ABSOLUTE NEUTROPHILS: 6259 CELLS/UL (ref 1500–7800)
ALBUMIN/GLOBULIN RATIO: 1.3 (CALC) (ref 1–2.5)
ALBUMIN: 4.4 G/DL (ref 3.6–5.1)
ALKALINE PHOSPHATASE: 60 U/L (ref 35–144)
ALT: 14 U/L (ref 9–46)
AST: 16 U/L (ref 10–35)
BASOPHILS: 0.6 %
BILIRUBIN, TOTAL: 0.7 MG/DL (ref 0.2–1.2)
BUN: 17 MG/DL (ref 7–25)
CALCIUM: 9.4 MG/DL (ref 8.6–10.3)
CARBON DIOXIDE: 28 MMOL/L (ref 20–32)
CHLORIDE: 103 MMOL/L (ref 98–110)
CHOL/HDLC RATIO: 4.5 (CALC)
CHOLESTEROL, TOTAL: 165 MG/DL
CREATININE: 1.13 MG/DL (ref 0.7–1.28)
EGFR: 67 ML/MIN/1.73M2
EOSINOPHILS: 4.3 %
GLOBULIN: 3.3 G/DL (CALC) (ref 1.9–3.7)
GLUCOSE: 103 MG/DL (ref 65–99)
HDL CHOLESTEROL: 37 MG/DL
HEMATOCRIT: 39.5 % (ref 38.5–50)
HEMOGLOBIN A1C: 6.2 % OF TOTAL HGB
HEMOGLOBIN: 12.8 G/DL (ref 13.2–17.1)
LDL-CHOLESTEROL: 98 MG/DL (CALC)
LYMPHOCYTES: 21.3 %
MCH: 27.3 PG (ref 27–33)
MCHC: 32.4 G/DL (ref 32–36)
MCV: 84.2 FL (ref 80–100)
MONOCYTES: 8.6 %
MPV: 10.6 FL (ref 7.5–12.5)
NEUTROPHILS: 65.2 %
NON-HDL CHOLESTEROL: 128 MG/DL (CALC)
PLATELET COUNT: 226 THOUSAND/UL (ref 140–400)
POTASSIUM: 4.1 MMOL/L (ref 3.5–5.3)
PROTEIN, TOTAL: 7.7 G/DL (ref 6.1–8.1)
RDW: 13.5 % (ref 11–15)
RED BLOOD CELL COUNT: 4.69 MILLION/UL (ref 4.2–5.8)
SODIUM: 139 MMOL/L (ref 135–146)
TRIGLYCERIDES: 206 MG/DL
WHITE BLOOD CELL COUNT: 9.6 THOUSAND/UL (ref 3.8–10.8)

## 2025-02-05 ENCOUNTER — OFFICE VISIT (OUTPATIENT)
Dept: FAMILY MEDICINE CLINIC | Facility: CLINIC | Age: 80
End: 2025-02-05
Payer: COMMERCIAL

## 2025-02-05 VITALS
SYSTOLIC BLOOD PRESSURE: 120 MMHG | BODY MASS INDEX: 23.45 KG/M2 | HEART RATE: 83 BPM | OXYGEN SATURATION: 99 % | RESPIRATION RATE: 16 BRPM | TEMPERATURE: 97 F | DIASTOLIC BLOOD PRESSURE: 62 MMHG | WEIGHT: 162 LBS | HEIGHT: 69.5 IN

## 2025-02-05 DIAGNOSIS — B35.3 TINEA PEDIS OF LEFT FOOT: ICD-10-CM

## 2025-02-05 DIAGNOSIS — Z00.00 ENCOUNTER FOR ANNUAL PHYSICAL EXAM: Primary | ICD-10-CM

## 2025-02-05 DIAGNOSIS — M48.062 NEUROGENIC CLAUDICATION DUE TO LUMBAR SPINAL STENOSIS: ICD-10-CM

## 2025-02-05 RX ORDER — GLUCOSAMINE/CHONDR SU A SOD 750-600 MG
TABLET ORAL
COMMUNITY
Start: 2025-01-29

## 2025-02-05 RX ORDER — CLOTRIMAZOLE AND BETAMETHASONE DIPROPIONATE 10; .64 MG/G; MG/G
1 CREAM TOPICAL 2 TIMES DAILY PRN
Qty: 60 G | Refills: 3 | Status: SHIPPED | OUTPATIENT
Start: 2025-02-05

## 2025-02-05 NOTE — PROGRESS NOTES
/62   Pulse 83   Temp 97.3 °F (36.3 °C) (Temporal)   Resp 16   Ht 5' 9.5\" (1.765 m)   Wt 162 lb (73.5 kg)   SpO2 99%   BMI 23.58 kg/m²  Body mass index is 23.58 kg/m².     Chief Complaint   Patient presents with    Physical       Celia Bangura is a 79 year old male who presents for a complete physical exam.   HPI:   Patient with history of coronary artery disease, hypertension hyperlipidemia who is here today for his annual physical  He is doing well denies any complaints except for pain in his legs bilaterally, described as tightening of his calf and posterior thighs  The pain comes only when he is walking.,  After walking 500 feet he has to sit down for 5 minutes to get his strength back  Then he can again walk 500 feet no problem, will need to sit down after 500 feet  Denies any tingling or numbness  Denies any bowel or bladder issues  Denies any denies any cyanosis    He is also here complaining of a rash between the toes of his left foot and the fourth webspace    Wt Readings from Last 4 Encounters:   02/05/25 162 lb (73.5 kg)   01/04/24 171 lb 9.6 oz (77.8 kg)   10/04/23 165 lb 6 oz (75 kg)   03/01/23 163 lb (73.9 kg)     Body mass index is 23.58 kg/m².   BP Readings from Last 3 Encounters:   02/05/25 120/62   01/04/24 122/70   10/04/23 124/70      Current Outpatient Medications   Medication Sig Dispense Refill    Glucosamine HCl 1500 MG Oral Tab glucosamine HCL 1,500 mg tablet, [RxNorm: 276232]      clotrimazole-betamethasone 1-0.05 % External Cream Apply 1 Application topically 2 (two) times daily as needed. 60 g 3    clopidogrel 75 MG Oral Tab Take 1 tablet (75 mg total) by mouth daily. 90 tablet 1    atorvastatin 40 MG Oral Tab Take 1 tablet (40 mg total) by mouth nightly. 90 tablet 0    Multiple Vitamins-Minerals (CENTRUM SILVER ADULT 50+ OR)       B Complex-Folic Acid (SUPER B COMPLEX MAXI) Oral Tab Super B Maxi Complex 0.4 mg tablet, [RxNorm: 0]      Coenzyme Q10 Liposomal 100 MG/ML Oral  Liquid Take by mouth.      acetaminophen 500 MG Oral Tab Take 1 tablet (500 mg total) by mouth every 6 (six) hours as needed for Pain.        Past Medical History:    Arthritis    Atherosclerosis of coronary artery    Cataract    Coronary atherosclerosis    Esophageal reflux    Essential hypertension    High blood pressure    High cholesterol    Hyperlipidemia    Myocardial infarction (HCC)      Past Surgical History:   Procedure Laterality Date    Angioplasty (coronary)  December 16, 2022    Cataract  May 2022    Other surgical history  Angioplasty in 2001      No family history on file.   Social History:  Social History     Socioeconomic History    Marital status:    Tobacco Use    Smoking status: Never     Passive exposure: Never    Smokeless tobacco: Never   Vaping Use    Vaping status: Never Used   Substance and Sexual Activity    Alcohol use: Never    Drug use: Never   Other Topics Concern    Caffeine Concern No    Exercise Yes     Comment: Can't walk due to hamstring pain    Seat Belt No    Special Diet No    Stress Concern Yes    Weight Concern Yes      Exercise: none.  Diet: doesn't watch     REVIEW OF SYSTEMS:   GENERAL HEALTH: feels well otherwise, denies fever  SKIN: denies any unusual skin lesions or rashes  EYES: no visual complaints or deficits  HEENT: denies nasal congestion, sinus pain or sore throat; hearing loss negative  RESPIRATORY: denies shortness of breath, wheezing or cough  CARDIOVASCULAR: denies chest pain or EVANS; no palpitations  GI: denies nausea, vomiting, constipation, diarrhea; no rectal bleeding; no heartburn  MUSCULOSKELETAL: no joint complaints upper or lower extremities  NEURO: no sensory or motor complaint  PSYCHE: no symptoms of depression or anxiety  HEMATOLOGY: denies h/o anemia; denies bruising or excessive bleeding  ENDOCRINE: denies excessive thirst or urination; denies unexpected wt gain or wt loss  ALLERGY/IMM.: denies food or seasonal allergies    EXAM:   BP  120/62   Pulse 83   Temp 97.3 °F (36.3 °C) (Temporal)   Resp 16   Ht 5' 9.5\" (1.765 m)   Wt 162 lb (73.5 kg)   SpO2 99%   BMI 23.58 kg/m²  Body mass index is 23.58 kg/m².   GENERAL: well developed, well nourished,in no apparent distress  SKIN: no rashes,no suspicious lesions  Tenia pedis on the left fourth webspace  HEENT: atraumatic, normocephalic,ears and throat are clear  EYES:PERRLA, EOMI,conjunctiva are clear  NECK: supple,no adenopathy,no bruits  CHEST: no chest tenderness  LUNGS: clear to auscultation  CARDIO: RRR without murmur  GI: good BS's,no masses, HSM or tenderness  : Deferred  RECTAL:Deferred  MUSCULOSKELETAL: back is not tender,FROM of the back  EXTREMITIES: no cyanosis, clubbing or edema  NEURO: Oriented times three,cranial nerves are intact,motor and sensory are grossly intact    ASSESSMENT AND PLAN:     Celia Bangura is a 79 year old male who presents for a complete physical exam.   Pt's weight is Body mass index is 23.58 kg/m².,   Eat a heart-healthy diet. Include potassium and fiber, and drink plenty of water.   Patient is advised to lose weight,   Exercise regularly --- Dietary measures discussed include diet about 1800 calories - Excercise regimen also reviewed as well as long term benefits on overall health.   Recommend at least 30 minutes a day 3-4 times a week of aerobic activity such as brisk walking, cycling, aerobics, or swimming.  Anerobic activities also encouraged for overall toning and strength/endurance building  Encounter for annual physical exam  Fasting labs ordered  Immunizations reviewed  Patient refused vaccinations  Depression screen completed  Diet and exercise counseling given  -     CBC With Differential With Platelet  -     Comp Metabolic Panel (14)  -     Lipid Panel  -     TSH and Free T4    Neurogenic claudication due to lumbar spinal stenosis  In my opinion patient has neurogenic claudication  Will get physical therapy of his lower back  -     Physical Therapy  Referral - [facility]    Tenia pedis  -     clotrimazole-betamethasone 1-0.05 % External Cream; Apply 1 Application topically 2 (two) times daily as needed.          The patient indicates understanding of these issues and agrees to the plan.  .      No follow-ups on file.        Mars Hoskins MD, 2/5/2025, 12:46 PM      This dictation was performed with a verbal recognition program (DRAGON) and it was checked for errors. It is possible that there are still dictated errors within this office note. If so, please bring any errors to my attention for an addendum. All efforts were made to ensure that this office note is accurate

## 2025-02-12 DIAGNOSIS — Z00.00 ENCOUNTER FOR ANNUAL PHYSICAL EXAM: ICD-10-CM

## 2025-02-12 LAB
ABSOLUTE BASOPHILS: 58 CELLS/UL (ref 0–200)
ABSOLUTE EOSINOPHILS: 183 CELLS/UL (ref 15–500)
ABSOLUTE LYMPHOCYTES: 3562 CELLS/UL (ref 850–3900)
ABSOLUTE MONOCYTES: 584 CELLS/UL (ref 200–950)
ABSOLUTE NEUTROPHILS: 2913 CELLS/UL (ref 1500–7800)
ALBUMIN/GLOBULIN RATIO: 1.5 (CALC) (ref 1–2.5)
ALBUMIN: 4.4 G/DL (ref 3.6–5.1)
ALKALINE PHOSPHATASE: 59 U/L (ref 35–144)
ALT: 15 U/L (ref 9–46)
AST: 18 U/L (ref 10–35)
BASOPHILS: 0.8 %
BILIRUBIN, TOTAL: 0.6 MG/DL (ref 0.2–1.2)
BUN: 17 MG/DL (ref 7–25)
CALCIUM: 9.3 MG/DL (ref 8.6–10.3)
CARBON DIOXIDE: 28 MMOL/L (ref 20–32)
CHLORIDE: 106 MMOL/L (ref 98–110)
CHOL/HDLC RATIO: 4.3 (CALC)
CHOLESTEROL, TOTAL: 149 MG/DL
CREATININE: 0.97 MG/DL (ref 0.7–1.28)
EGFR: 79 ML/MIN/1.73M2
EOSINOPHILS: 2.5 %
GLOBULIN: 2.9 G/DL (CALC) (ref 1.9–3.7)
GLUCOSE: 95 MG/DL (ref 65–99)
HDL CHOLESTEROL: 35 MG/DL
HEMATOCRIT: 42.1 % (ref 38.5–50)
HEMOGLOBIN: 13.8 G/DL (ref 13.2–17.1)
LDL-CHOLESTEROL: 90 MG/DL (CALC)
LYMPHOCYTES: 48.8 %
MCH: 28.1 PG (ref 27–33)
MCHC: 32.8 G/DL (ref 32–36)
MCV: 85.7 FL (ref 80–100)
MONOCYTES: 8 %
MPV: 10.6 FL (ref 7.5–12.5)
NEUTROPHILS: 39.9 %
NON-HDL CHOLESTEROL: 114 MG/DL (CALC)
PLATELET COUNT: 249 THOUSAND/UL (ref 140–400)
POTASSIUM: 4.3 MMOL/L (ref 3.5–5.3)
PROTEIN, TOTAL: 7.3 G/DL (ref 6.1–8.1)
RDW: 13.5 % (ref 11–15)
RED BLOOD CELL COUNT: 4.91 MILLION/UL (ref 4.2–5.8)
SODIUM: 141 MMOL/L (ref 135–146)
T4, FREE: 1 NG/DL (ref 0.8–1.8)
TRIGLYCERIDES: 138 MG/DL
TSH: 1.49 MIU/L (ref 0.4–4.5)
WHITE BLOOD CELL COUNT: 7.3 THOUSAND/UL (ref 3.8–10.8)

## 2025-03-04 RX ORDER — CLOPIDOGREL BISULFATE 75 MG/1
75 TABLET ORAL DAILY
Qty: 90 TABLET | Refills: 3 | Status: SHIPPED | OUTPATIENT
Start: 2025-03-04

## 2025-03-04 RX ORDER — ATORVASTATIN CALCIUM 40 MG/1
40 TABLET, FILM COATED ORAL NIGHTLY
Qty: 90 TABLET | Refills: 0 | Status: SHIPPED | OUTPATIENT
Start: 2025-03-04

## 2025-03-04 NOTE — TELEPHONE ENCOUNTER
Please Review.   Requested Prescriptions     Pending Prescriptions Disp Refills    CLOPIDOGREL 75 MG Oral Tab [Pharmacy Med Name: CLOPIDOGREL 75MG TABLETS] 90 tablet 1     Sig: TAKE 1 TABLET(75 MG) BY MOUTH DAILY    ATORVASTATIN 40 MG Oral Tab [Pharmacy Med Name: ATORVASTATIN 40MG TABLETS] 90 tablet 0     Sig: TAKE 1 TABLET(40 MG) BY MOUTH EVERY NIGHT   Last fill       Atorvastatin Calcium 40 mg Oral Nightly

## 2025-06-02 ENCOUNTER — TELEPHONE (OUTPATIENT)
Dept: PHYSICAL THERAPY | Facility: HOSPITAL | Age: 80
End: 2025-06-02

## 2025-06-03 ENCOUNTER — OFFICE VISIT (OUTPATIENT)
Dept: PHYSICAL THERAPY | Age: 80
End: 2025-06-03
Attending: FAMILY MEDICINE
Payer: COMMERCIAL

## 2025-06-03 DIAGNOSIS — M48.062 NEUROGENIC CLAUDICATION DUE TO LUMBAR SPINAL STENOSIS: Primary | ICD-10-CM

## 2025-06-03 PROCEDURE — 97162 PT EVAL MOD COMPLEX 30 MIN: CPT

## 2025-06-03 PROCEDURE — 97110 THERAPEUTIC EXERCISES: CPT

## 2025-06-04 NOTE — PROGRESS NOTES
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  History of current condition: Pt is a 78 y/o M who presents for evaluation of BLE pain. Pt states that his pain has been ongoing for a couple of months now. Pt denies LBP at this time but does walk w/ a forward flexed posture (stenosis). Pts leg pain has resulted in limited walking to about 10 min as well as difficulty with transitional activities.   Pain level: current 5 /10, at best 2 /10, at worst 8 /10  Description of symptoms: dull pain that turns sharp if pressure is applied in his popliteal space   Occupation: retired   Leisure activities/Hobbies: walking and going outside   Prior level of function: independent w/o difficulty  Current limitations: limited to about 10 min of walking before rest break.Pain with transitional movements, and unsteadiness on his feet making him a fall risk.  Pt goals: Improve walking distancne and tolerance  Red flag signs/symptoms: Pt denies dizziness, drop attacks, dysphagia, dysarthria, diplopia; Pt denies pain that wakes in sleep, fever, recent trauma, history of CA, pain unchanged with movement/activity; Pt denies changes in bowel/bladder function, saddle anesthesia    Past medical history was reviewed with Celia.  Significant findings include:    Imaging/Tests:     Celia  has a past medical history of Arthritis, Atherosclerosis of coronary artery, Cataract, Coronary atherosclerosis, Esophageal reflux, Essential hypertension, High blood pressure, High cholesterol, Hyperlipidemia, and Myocardial infarction (HCC) (2001, 2022).  He   has a past surgical history that includes angioplasty (coronary) (December 16, 2022); cataract (May 2022); and other surgical history (Angioplasty in 2001).    ASSESSMENT  Celia presents to physical therapy evaluation with primary c/o BLE pain that travels from his glutes to his posterior knee worse on the R than the L.. The results of the objective tests and measures show Weakness of core and glute musuclture, poor balance strategy, severe soft tissue restrictions of his posterior chain muscualture, and decreased activity tolerance. Functional deficits include but are not limited to limited to about 10 min of walking before rest break.Pain with transitional movements, and unsteadiness on his feet making him a fall risk.. 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 Pt and PT discussed evaluation findings, pathology, POC and HEP.  Pt voiced understanding and performs HEP correctly without reported pain. Skilled Physical Therapy is medically necessary to address the above impairments and reach functional goals.    OBJECTIVE:    Musculoskeletal:  Observation/Posture: forward head posture; rounded shoulders; stooped forward posture; anterior pelvic tilt  Palpation: increased tone throughout, tenderness of glute, hamstring and gastroc musuclture indicating Trps, possible bakers cyst on R.     Special tests:   Repeated Motions Testing:  Flexion: improvement in symptoms  Extension: increase pain Slump: + SLR: R +, L + Active SLR: R hamstring tightness, L hamstring tightness     ROM and Strength:  (* denotes performed with pain)  Hip   MMT (-/5)    R L     Flex (L2) 4/5 4/5     Ext  4-/5 4-/5     Abd 3+/5 3+/4     ER 3+/5 3+/5     IR 4/5 4/5    ,   Knee   MMT (-/5)    R L     Flex 4/5 4/5     Ext (L3) 4+/5 4+/5       Flexibility:  LE Flexibility R L     Hip Flexor mod restricted mod restricted     Hamstrings significantly restricted significantly  restricted     ITB significantly restricted significantly restricted     Piriformis mod restricted mod restricted     Quads mod restricted mod restricted     Gastroc-soleus significantly restricted significantly restricted       Neurological:  Sensation: grossly intact to light touch SAEED UE/LE     Deep Tendon Reflexes: grossly intact SAEED UE/LE     UMN:      Alberto's: negative     Clonus: negative         Peripheral Neurodynamic: WNL except     Balance and Functional Mobility:  Gait: pt ambulates on level ground with decreased step length; decreased stance phase; decreased foot clearance; decreased arm swing; stooped posture/forward lean; difficulty turning.  Balance: SLS: EO R 5 sec, EO L 5 sec          Today's Treatment and Response:   Pt education was provided on exam findings, treatment diagnosis, treatment plan, expectations, and prognosis.  Today's Treatment       6/3/2025   Spine Treatment   Therapeutic Exercise -exercise ball B knee to chest + LTR x10  -Sciatic nerve glides x8 per leg  -hamstring stretch in hook-lying  -bridges   Therapeutic Exercise Minutes 10   Evaluation Minutes 35   Total Time Of Timed Procedures 10   Total Time Of Service-Based Procedures 35   Total Treatment Time 45   HEP Access Code: [email or web address]  URL: [email or web address]  Date: 06/03/2025  Prepared by: Brennen Guzman    Exercises  - Hooklying Hamstring Stretch with Strap  - 1 x daily - 5-7 x weekly - 3 sets - 30-40 hold  - Gastroc Stretch on Wall  - 1 x daily - 5-7 x weekly - 3 sets - 20-30 hold  - Supine Sciatic Nerve Glide  - 5-7 x weekly - 3 sets - 10 reps  - Supine Knees to Chest with Swiss Ball  - 5-6 x weekly - 3 sets - 10 reps  - Supine Lower Trunk Rotation with Swiss Ball  - 5-6 x weekly - 3 sets - 10 reps  - Beginner Bridge  - 3 x weekly - 3 sets - 10 reps        Patient was instructed in and issued a HEP for: Access Code: [email or web address]  URL: [email or web address]  Date: 06/03/2025  Prepared by:  Brennen Loryano    Exercises  - Hooklying Hamstring Stretch with Strap  - 1 x daily - 5-7 x weekly - 3 sets - 30-40 hold  - Gastroc Stretch on Wall  - 1 x daily - 5-7 x weekly - 3 sets - 20-30 hold  - Supine Sciatic Nerve Glide  - 5-7 x weekly - 3 sets - 10 reps  - Supine Knees to Chest with Swiss Ball  - 5-6 x weekly - 3 sets - 10 reps  - Supine Lower Trunk Rotation with Swiss Ball  - 5-6 x weekly - 3 sets - 10 reps  - Beginner Bridge  - 3 x weekly - 3 sets - 10 reps    Charges:  PT EVAL: Moderate Complexity, There Ex(1) Mod complex eval(1)  In agreement with evaluation findings and clinical rationale, this evaluation involved MODERATE COMPLEXITY decision making due to 1-2 personal factors/comorbidities, 3 or more body structures involved/activity limitations, and evolving symptoms as documented in the evaluation.                                                                         PLAN OF CARE:    Goals: (to be met in 10 visits)    Not Met Progress Toward Partially Met Met   Pt will improve transversus abdominis recruitment to perform proper isometric contraction without requiring verbal or tactile cuing to promote advancement of therex. [] [] [] []   Pt will demonstrate good understanding of proper posture and body mechanics to decrease pain and improve spinal safety. [] [] [] []   Pt will report improved symptom centralization and absence of radicular symptoms for 3 consecutive days to improve function with ADLs. [] [] [] []   Pt will have decreased paraspinal and posterior chain mm tension to tolerate standing >35 minutes for work and home activities. [] [] [] []   Pt will demonstrate improved core strength to be able to perform transitional movements and prolonged standing with <3/10 pain. [] [] [] []   Pt will be independent and compliant with comprehensive HEP to maintain progress achieved in PT [] [] [] []         Frequency / Duration: Patient will be seen 2x weeklyx/week or a total of 10  visits over a 90  day period. Treatment will include: Gait training; Manual Therapy; Mechanical Traction; Neuromuscular Re-education; Self-Care Home Management; Therapeutic Activities; Therapeutic Exercise; Home Exercise Program instruction; Other (use comment) (Modalaties as needed for pain mod)    Education or treatment limitation: Communication   Rehab Potential: good     LEFS Score  LEFS Score: (Patient-Rptd) 33.75 % (5/27/2025 12:03 PM)      Patient/Family/Caregiver was advised of these findings, precautions, and treatment options and has agreed to actively participate in planning and for this course of care.    Thank you for your referral. Please co-sign or sign and return this letter via fax as soon as possible to [phone]. If you have any questions, please contact me at Dept: [phone]    Sincerely,  Electronically signed by therapist: Brennen Guzman PT  Physician's certification required: Yes  I certify the need for these services furnished under this plan of treatment and while under my care.    X___________________________________________________ Date____________________    Certification From: 6/4/2025  To:9/2/2025

## 2025-06-05 ENCOUNTER — APPOINTMENT (OUTPATIENT)
Dept: PHYSICAL THERAPY | Age: 80
End: 2025-06-05
Attending: FAMILY MEDICINE
Payer: COMMERCIAL

## 2025-06-05 RX ORDER — ATORVASTATIN CALCIUM 40 MG/1
40 TABLET, FILM COATED ORAL NIGHTLY
Qty: 90 TABLET | Refills: 3 | Status: SHIPPED | OUTPATIENT
Start: 2025-06-05

## 2025-06-05 NOTE — TELEPHONE ENCOUNTER
Refill passes per [facility] protocol.    No future appointments with primary care medicine

## 2025-06-10 ENCOUNTER — APPOINTMENT (OUTPATIENT)
Dept: PHYSICAL THERAPY | Age: 80
End: 2025-06-10
Attending: FAMILY MEDICINE
Payer: COMMERCIAL

## 2025-06-12 ENCOUNTER — APPOINTMENT (OUTPATIENT)
Dept: PHYSICAL THERAPY | Age: 80
End: 2025-06-12
Attending: FAMILY MEDICINE
Payer: COMMERCIAL

## 2025-06-16 ENCOUNTER — OFFICE VISIT (OUTPATIENT)
Dept: PHYSICAL THERAPY | Age: 80
End: 2025-06-16
Attending: FAMILY MEDICINE
Payer: COMMERCIAL

## 2025-06-16 PROCEDURE — 97110 THERAPEUTIC EXERCISES: CPT

## 2025-06-16 PROCEDURE — 97140 MANUAL THERAPY 1/> REGIONS: CPT

## 2025-06-16 NOTE — PROGRESS NOTES
Patient: Celia Bangura (79 year old, male) Referring Provider:  Insurance:   Diagnosis: Neurogenic claudication due to lumbar spinal stenosis (M48.062) Mars Hoskins  [facility]   Date of Surgery: n/a Next MD visit:  N/A   Precautions:  None; Fall Risk unknown Referral Information:    Date of Evaluation: Req: 5, Auth: 5, Exp: 8/30/2025 06/03/25 POC Auth Visits:  5       Today's Date   6/16/2025    Subjective  Pt reports of a slight improvement in his posterior BLE pain following completion of HEP. Pt's daughter states his HEP has been limited to about 15 minutes to avoid aggravation and to slowly progress. Pt denies any LBP at this time as well as any difficulty with his HEP.       Pain: 2/10     Objective  Weakness of core and glute musuclture bilaterally. Soft tissue restrictions throughtout posterior chain bilaterally  Limited knee extension bilaterally           Assessment  Pt responded well to skilled therapy session w/ reports of an improvement in posterior leg pain following exercise and manual prescriuption. Pt was progressed in CKC exercises that focused on lumbar flexion, posterior chain extensibility and BLE strengthening due to deficits in the aforementioned areas.     Goals (to be met in 10 visits)      Not Met Progress Toward Partially Met Met   Pt will improve transversus abdominis recruitment to perform proper isometric contraction without requiring verbal or tactile cuing to promote advancement of therex. [] [] [] []   Pt will demonstrate good understanding of proper posture and body mechanics to decrease pain and improve spinal safety. [] [] [] []   Pt will report improved symptom centralization and absence of radicular symptoms for 3 consecutive days to improve function with ADLs. [] [] [] []   Pt will have decreased paraspinal and posterior chain mm tension to tolerate standing >35 minutes for work and home activities. [] [] [] []   Pt will demonstrate improved core strength to be able to perform  transitional movements and prolonged standing with <3/10 pain. [] [] [] []   Pt will be independent and compliant with comprehensive HEP to maintain progress achieved in PT [] [] [] []             Plan  Progress per POC focus on SLS, core and glute strengthening.    Treatment Last 4 Visits  Treatment Day: 2       6/3/2025 6/16/2025   Spine Treatment   Therapeutic Exercise -exercise ball B knee to chest + LTR x10  -Sciatic nerve glides x8 per leg  -hamstring stretch in hook-lying  -bridges -NuStep lvl 3 x6 min  -gastroc stretch in standing using slant board 3x30 sec holds  -DF/PF using slant board x20 w/ BUE assist  -DL shuttle squats lvl 5 3x10  -B knee to chest + LTR 2x12 each  -Sciatic nerve glides x10 per leg  -PT assisted hamstring stretch w/ contract relax x5 each side  -SAQ using bolster 2x12    Manual Therapy  -ITB, glute and hamstring rolling done bilaterally in side lying    Therapeutic Exercise Minutes 10 35   Manual Therapy Minutes  8   Evaluation Minutes 35    Total Time Of Timed Procedures 10 43   Total Time Of Service-Based Procedures 35 0   Total Treatment Time 45 43   HEP Access Code: [email or web address]  URL: [email or web address]  Date: 06/03/2025  Prepared by: Brennen Guzman    Exercises  - Hooklying Hamstring Stretch with Strap  - 1 x daily - 5-7 x weekly - 3 sets - 30-40 hold  - Gastroc Stretch on Wall  - 1 x daily - 5-7 x weekly - 3 sets - 20-30 hold  - Supine Sciatic Nerve Glide  - 5-7 x weekly - 3 sets - 10 reps  - Supine Knees to Chest with Swiss Ball  - 5-6 x weekly - 3 sets - 10 reps  - Supine Lower Trunk Rotation with Swiss Ball  - 5-6 x weekly - 3 sets - 10 reps  - Beginner Bridge  - 3 x weekly - 3 sets - 10 reps         HEP  Access Code: [email or web address]  URL: [email or web address]  Date: 06/03/2025  Prepared by: Brennen Guzman    Exercises  - Hooklying Hamstring Stretch with Strap  - 1 x daily - 5-7 x weekly - 3 sets - 30-40 hold  - Gastroc Stretch on Wall  - 1 x daily  - 5-7 x weekly - 3 sets - 20-30 hold  - Supine Sciatic Nerve Glide  - 5-7 x weekly - 3 sets - 10 reps  - Supine Knees to Chest with Swiss Ball  - 5-6 x weekly - 3 sets - 10 reps  - Supine Lower Trunk Rotation with Swiss Ball  - 5-6 x weekly - 3 sets - 10 reps  - Beginner Bridge  - 3 x weekly - 3 sets - 10 reps    Charges  There Ex(2) MT(1)

## 2025-06-18 ENCOUNTER — OFFICE VISIT (OUTPATIENT)
Dept: PHYSICAL THERAPY | Age: 80
End: 2025-06-18
Attending: FAMILY MEDICINE
Payer: COMMERCIAL

## 2025-06-18 PROCEDURE — 97140 MANUAL THERAPY 1/> REGIONS: CPT

## 2025-06-18 PROCEDURE — 97110 THERAPEUTIC EXERCISES: CPT

## 2025-06-20 NOTE — PROGRESS NOTES
Patient: Celia Bangura (79 year old, male) Referring Provider:  Insurance:   Diagnosis: Neurogenic claudication due to lumbar spinal stenosis (M48.062) Mars Hoskins  [facility]   Date of Surgery: n/a Next MD visit:  N/A   Precautions:  None; Fall Risk unknown Referral Information:    Date of Evaluation: Req: 5, Auth: 5, Exp: 8/30/2025 06/03/25 POC Auth Visits:  5       Today's Date   6/18/2025    Subjective  Pt reports an improvement in his posterior BLE pain following last therapy session w/ minor complaints of soreness in the area of his hips. Pt states he has been completing his HEP w/o any difficulty or pain. Pt denies any LBP at this time. Pt reports fatigue and BLE discomfort if walking or standing for more than 15 min. no other concerns at this time.       Pain: 3/10     Objective  Weakness of core and glute musuclture bilaterally. Soft tissue restrictions throughtout posterior chain bilaterally. Improvements in forward flexion following exercise prescription.            Assessment  Pt responded well to skilled therapy session w/ reports of an improvement in posterior leg pain following exercise and manual prescriuption. Pt was progressed in CKC exercises that focused on BLE and core strengthening due to deficits. Pt will continue to benefit from skilled therapy to improve lumbopelvic stability and overall activity tolerance.    Goals (to be met in 10 visits)      Not Met Progress Toward Partially Met Met   Pt will improve transversus abdominis recruitment to perform proper isometric contraction without requiring verbal or tactile cuing to promote advancement of therex. [] [] [] []   Pt will demonstrate good understanding of proper posture and body mechanics to decrease pain and improve spinal safety. [] [] [] []   Pt will report improved symptom centralization and absence of radicular symptoms for 3 consecutive days to improve function with ADLs. [] [] [] []   Pt will have decreased paraspinal and posterior  chain mm tension to tolerate standing >35 minutes for work and home activities. [] [] [] []   Pt will demonstrate improved core strength to be able to perform transitional movements and prolonged standing with <3/10 pain. [] [] [] []   Pt will be independent and compliant with comprehensive HEP to maintain progress achieved in PT [] [] [] []                 Plan  Progress per POC focus on SLS, core and glute strengthening.    Treatment Last 4 Visits  Treatment Day: 3       6/3/2025 6/16/2025 6/18/2025   Spine Treatment   Therapeutic Exercise -exercise ball B knee to chest + LTR x10  -Sciatic nerve glides x8 per leg  -hamstring stretch in hook-lying  -bridges -NuStep lvl 3 x6 min  -gastroc stretch in standing using slant board 3x30 sec holds  -DF/PF using slant board x20 w/ BUE assist  -DL shuttle squats lvl 5 3x10  -B knee to chest + LTR 2x12 each  -Sciatic nerve glides x10 per leg  -PT assisted hamstring stretch w/ contract relax x5 each side  -SAQ using bolster 2x12  -NuStep lvl 3 x6 min  -gastroc stretch in standing using slant board 3x30 sec holds  -DL shuttle squats lvl 5 3x10  -B knee to chest + LTR 2x12 each  -Hook-lying hip abduction using GTB 2x10 w/ 2 sec holds  -Hook-lying hip add ball squeezes 2x10 w/ 3 sec holds  -PT assisted hamstring stretch w/ contract relax x5 each side  -partial bridge on exercise ball 2x10  -Standing hip ABD + Ext 2x10 per leg w/ BUE assist           Manual Therapy  -ITB, glute and hamstring rolling done bilaterally in side lying  -ITB, glute and hamstring rolling done bilaterally in side lying   -assisted contract relax hamstring stretch done bilaterally        Therapeutic Exercise Minutes 10 35 35   Manual Therapy Minutes  8 10   Evaluation Minutes 35     Total Time Of Timed Procedures 10 43 45   Total Time Of Service-Based Procedures 35 0 0   Total Treatment Time 45 43 45   HEP Access Code: [email or web address]  URL: [email or web address]  Date: 06/03/2025  Prepared by:  Brennen Guzman    Exercises  - Hooklying Hamstring Stretch with Strap  - 1 x daily - 5-7 x weekly - 3 sets - 30-40 hold  - Gastroc Stretch on Wall  - 1 x daily - 5-7 x weekly - 3 sets - 20-30 hold  - Supine Sciatic Nerve Glide  - 5-7 x weekly - 3 sets - 10 reps  - Supine Knees to Chest with Swiss Ball  - 5-6 x weekly - 3 sets - 10 reps  - Supine Lower Trunk Rotation with Swiss Ball  - 5-6 x weekly - 3 sets - 10 reps  - Beginner Bridge  - 3 x weekly - 3 sets - 10 reps          HEP  Access Code: [email or web address]  URL: [email or web address]  Date: 06/03/2025  Prepared by: Brennen Guzman    Exercises  - Hooklying Hamstring Stretch with Strap  - 1 x daily - 5-7 x weekly - 3 sets - 30-40 hold  - Gastroc Stretch on Wall  - 1 x daily - 5-7 x weekly - 3 sets - 20-30 hold  - Supine Sciatic Nerve Glide  - 5-7 x weekly - 3 sets - 10 reps  - Supine Knees to Chest with Swiss Ball  - 5-6 x weekly - 3 sets - 10 reps  - Supine Lower Trunk Rotation with Swiss Ball  - 5-6 x weekly - 3 sets - 10 reps  - Beginner Bridge  - 3 x weekly - 3 sets - 10 reps    Charges  There Ex(2) MT(1)

## 2025-06-24 ENCOUNTER — APPOINTMENT (OUTPATIENT)
Dept: PHYSICAL THERAPY | Age: 80
End: 2025-06-24
Attending: FAMILY MEDICINE
Payer: COMMERCIAL

## 2025-06-25 ENCOUNTER — TELEPHONE (OUTPATIENT)
Dept: PHYSICAL THERAPY | Facility: HOSPITAL | Age: 80
End: 2025-06-25

## 2025-06-26 ENCOUNTER — APPOINTMENT (OUTPATIENT)
Dept: PHYSICAL THERAPY | Age: 80
End: 2025-06-26
Attending: FAMILY MEDICINE
Payer: COMMERCIAL

## 2025-07-02 ENCOUNTER — OFFICE VISIT (OUTPATIENT)
Dept: PHYSICAL THERAPY | Age: 80
End: 2025-07-02
Attending: FAMILY MEDICINE
Payer: COMMERCIAL

## 2025-07-02 PROCEDURE — 97110 THERAPEUTIC EXERCISES: CPT

## 2025-07-02 PROCEDURE — 97140 MANUAL THERAPY 1/> REGIONS: CPT

## 2025-07-02 NOTE — PROGRESS NOTES
Patient: Celia Bangura (79 year old, male) Referring Provider:  Insurance:   Diagnosis: Neurogenic claudication due to lumbar spinal stenosis (M48.062) Mars Hoskins  [facility]   Date of Surgery: n/a Next MD visit:  N/A   Precautions:  None; Fall Risk unknown Referral Information:    Date of Evaluation: Req: 5, Auth: 5, Exp: 8/30/2025 06/03/25 POC Auth Visits:  5       Today's Date   7/2/2025    Subjective  Pt reports an improvement in his posterior BLE pain following last therapy session w/ minor complaints of soreness in the area of his hips. Pt states he has been completing his HEP w/o any difficulty or pain and has been walking consistely. Pt denies any LBP at this time. Pt reports fatigue and BLE discomfort if walking or standing for more than 20 min. No other concerns at this time.       Pain: 3/10     Objective  Weakness of core and glute musuclture bilaterally. Soft tissue restrictions throughtout posterior chain bilaterally. Improvements in forward flexion following exercise prescription.    Improvement in posterior knee pain following light gastroc release and grade ll/lll joint mobilizations        Assessment  Pt responded well to skilled therapy session w/ reports of an improvement in posterior leg pain following exercise and manual prescriuption. Pt was progressed in CKC exercises that focused on BLE and core strengthening due to deficits. Pt will continue to benefit from skilled therapy to improve lumbopelvic stability and overall activity tolerance.    Goals (to be met in 10 visits)      Not Met Progress Toward Partially Met Met   Pt will improve transversus abdominis recruitment to perform proper isometric contraction without requiring verbal or tactile cuing to promote advancement of therex. [] [] [] []   Pt will demonstrate good understanding of proper posture and body mechanics to decrease pain and improve spinal safety. [] [] [] []   Pt will report improved symptom centralization and absence of  radicular symptoms for 3 consecutive days to improve function with ADLs. [] [] [] []   Pt will have decreased paraspinal and posterior chain mm tension to tolerate standing >35 minutes for work and home activities. [] [] [] []   Pt will demonstrate improved core strength to be able to perform transitional movements and prolonged standing with <3/10 pain. [] [] [] []   Pt will be independent and compliant with comprehensive HEP to maintain progress achieved in PT [] [] [] []                     Plan  Progress per POC focus on SLS, core and glute strengthening.    Treatment Last 4 Visits  Treatment Day: 4       6/3/2025 6/16/2025 6/18/2025 7/2/2025   Spine Treatment   Therapeutic Exercise -exercise ball B knee to chest + LTR x10  -Sciatic nerve glides x8 per leg  -hamstring stretch in hook-lying  -bridges -NuStep lvl 3 x6 min  -gastroc stretch in standing using slant board 3x30 sec holds  -DF/PF using slant board x20 w/ BUE assist  -DL shuttle squats lvl 5 3x10  -B knee to chest + LTR 2x12 each  -Sciatic nerve glides x10 per leg  -PT assisted hamstring stretch w/ contract relax x5 each side  -SAQ using bolster 2x12  -NuStep lvl 3 x6 min  -gastroc stretch in standing using slant board 3x30 sec holds  -DL shuttle squats lvl 5 3x10  -B knee to chest + LTR 2x12 each  -Hook-lying hip abduction using GTB 2x10 w/ 2 sec holds  -Hook-lying hip add ball squeezes 2x10 w/ 3 sec holds  -PT assisted hamstring stretch w/ contract relax x5 each side  -partial bridge on exercise ball 2x10  -Standing hip ABD + Ext 2x10 per leg w/ BUE assist         -NuStep lvl 3 x6 min   -gastroc stretch in standing using slant board 3x30 sec holds   -DL shuttle squats lvl 5 3x10   -B knee to chest + LTR 2x12 each   -Hook-lying hip abduction using GTB 2x10 w/ 2 sec holds   -Hook-lying hip add ball squeezes 2x10 w/ 3 sec holds   -PT assisted hamstring stretch w/ contract relax x5 each side   -partial bridge on exercise ball 2x10   -Standing hip ABD +  Ext 2x10 per leg w/ BUE assist   -DL squats at shuttle 2x12 lvl 5       Manual Therapy  -ITB, glute and hamstring rolling done bilaterally in side lying  -ITB, glute and hamstring rolling done bilaterally in side lying   -assisted contract relax hamstring stretch done bilaterally      -ITB, glute and hamstring rolling done bilaterally in side lying  -assisted contract relax hamstring stretch done bilaterally  -Gastroc strumming done bilaterally  -knee joint mobilizations for pain mod grade ll/lll              Therapeutic Exercise Minutes 10 35 35 35   Manual Therapy Minutes  8 10 8   Evaluation Minutes 35      Total Time Of Timed Procedures 10 43 45 43   Total Time Of Service-Based Procedures 35 0 0 0   Total Treatment Time 45 43 45 43   HEP Access Code: [email or web address]  URL: [email or web address]  Date: 06/03/2025  Prepared by: Brennen Guzman    Exercises  - Hooklying Hamstring Stretch with Strap  - 1 x daily - 5-7 x weekly - 3 sets - 30-40 hold  - Gastroc Stretch on Wall  - 1 x daily - 5-7 x weekly - 3 sets - 20-30 hold  - Supine Sciatic Nerve Glide  - 5-7 x weekly - 3 sets - 10 reps  - Supine Knees to Chest with Swiss Ball  - 5-6 x weekly - 3 sets - 10 reps  - Supine Lower Trunk Rotation with Swiss Ball  - 5-6 x weekly - 3 sets - 10 reps  - Beginner Bridge  - 3 x weekly - 3 sets - 10 reps           HEP  Access Code: [email or web address]  URL: [email or web address]  Date: 06/03/2025  Prepared by: Brennen Guzman    Exercises  - Hooklying Hamstring Stretch with Strap  - 1 x daily - 5-7 x weekly - 3 sets - 30-40 hold  - Gastroc Stretch on Wall  - 1 x daily - 5-7 x weekly - 3 sets - 20-30 hold  - Supine Sciatic Nerve Glide  - 5-7 x weekly - 3 sets - 10 reps  - Supine Knees to Chest with Swiss Ball  - 5-6 x weekly - 3 sets - 10 reps  - Supine Lower Trunk Rotation with Swiss Ball  - 5-6 x weekly - 3 sets - 10 reps  - Beginner Bridge  - 3 x weekly - 3 sets - 10 reps    Charges  There Ex(2) MT(1)

## 2025-07-07 ENCOUNTER — OFFICE VISIT (OUTPATIENT)
Dept: PHYSICAL THERAPY | Age: 80
End: 2025-07-07
Attending: FAMILY MEDICINE
Payer: COMMERCIAL

## 2025-07-07 PROCEDURE — 97140 MANUAL THERAPY 1/> REGIONS: CPT

## 2025-07-07 PROCEDURE — 97110 THERAPEUTIC EXERCISES: CPT

## 2025-07-07 NOTE — PROGRESS NOTES
Patient: Celia Bangura (79 year old, male) Referring Provider:  Insurance:   Diagnosis: Neurogenic claudication due to lumbar spinal stenosis (M48.062) Mars Hoskins  [facility]   Date of Surgery: n/a Next MD visit:  N/A   Precautions:  None; Fall Risk unknown Referral Information:    Date of Evaluation: Req: 5, Auth: 5, Exp: 8/30/2025 06/03/25 POC Auth Visits:  5       Today's Date   7/7/2025    Subjective  Pt reports an improvement in his posterior BLE pain following last therapy session w/ minor complaints of soreness in the area of his hips. Pt states he has been completing his HEP w/o any difficulty or pain. Pt denies any LBP at this time. Pt reports some anterior shin pain and gastroc pain after walking downtown for prolonged periods of time.No other concerns at this time.        Pain: 2/10     Objective  Weakness of core and glute musuclture bilaterally. Soft tissue restrictions throughtout posterior chain bilaterally. Improvements in forward flexion following exercise prescription.    Decrease in anterior shin, gastroc and posterior knee pain following stretches and soft tissue work.         Assessment  Pt responded well to skilled therapy session w/ reports of an improvement in posterior leg and anterior shin pain following exercise and manual prescriuption. Pt was progressed in CKC exercises that focused on BLE and core strengthening due to deficits. Pt was progressed in resisted band walks w/ supervision w/o difficulty just reports of fatigue. Pt will continue to benefit from skilled therapy to improve lumbopelvic stability and overall activity tolerance.    Goals (to be met in 10 visits)      Not Met Progress Toward Partially Met Met   Pt will improve transversus abdominis recruitment to perform proper isometric contraction without requiring verbal or tactile cuing to promote advancement of therex. [] [] [] []   Pt will demonstrate good understanding of proper posture and body mechanics to decrease pain  and improve spinal safety. [] [] [] []   Pt will report improved symptom centralization and absence of radicular symptoms for 3 consecutive days to improve function with ADLs. [] [] [] []   Pt will have decreased paraspinal and posterior chain mm tension to tolerate standing >35 minutes for work and home activities. [] [] [] []   Pt will demonstrate improved core strength to be able to perform transitional movements and prolonged standing with <3/10 pain. [] [] [] []   Pt will be independent and compliant with comprehensive HEP to maintain progress achieved in PT [] [] [] []                         Plan  Progress per POC focus on SLS, core and glute strengthening. Reminded importance of flexibility training and consistency. Add resisted lateral band walks next visit.    Treatment Last 4 Visits  Treatment Day: 5 6/16/2025 6/18/2025 7/2/2025 7/7/2025   Spine Treatment   Therapeutic Exercise -NuStep lvl 3 x6 min  -gastroc stretch in standing using slant board 3x30 sec holds  -DF/PF using slant board x20 w/ BUE assist  -DL shuttle squats lvl 5 3x10  -B knee to chest + LTR 2x12 each  -Sciatic nerve glides x10 per leg  -PT assisted hamstring stretch w/ contract relax x5 each side  -SAQ using bolster 2x12  -NuStep lvl 3 x6 min  -gastroc stretch in standing using slant board 3x30 sec holds  -DL shuttle squats lvl 5 3x10  -B knee to chest + LTR 2x12 each  -Hook-lying hip abduction using GTB 2x10 w/ 2 sec holds  -Hook-lying hip add ball squeezes 2x10 w/ 3 sec holds  -PT assisted hamstring stretch w/ contract relax x5 each side  -partial bridge on exercise ball 2x10  -Standing hip ABD + Ext 2x10 per leg w/ BUE assist         -NuStep lvl 3 x6 min   -gastroc stretch in standing using slant board 3x30 sec holds   -DL shuttle squats lvl 5 3x10   -B knee to chest + LTR 2x12 each   -Hook-lying hip abduction using GTB 2x10 w/ 2 sec holds   -Hook-lying hip add ball squeezes 2x10 w/ 3 sec holds   -PT assisted hamstring stretch w/  contract relax x5 each side   -partial bridge on exercise ball 2x10   -Standing hip ABD + Ext 2x10 per leg w/ BUE assist      -NuStep lvl 3 x6 min   -gastroc stretch in standing using slant board 3x30 sec holds   -DL shuttle squats lvl 5 3x10   -B knee to chest + LTR 2x12 each   -Hook-lying hip abduction using GTB 2x10 w/ 2 sec holds   -Hook-lying hip add ball squeezes 2x10 w/ 3 sec holds   -Monster walks using RTB at knees 2 laps in open gym w/ supervision.   -PT assisted hamstring stretch w/ contract relax x5 each side   -partial bridge on exercise ball 2x10   -Standing hip ABD + Ext 2x10 per leg w/ BUE assist      Manual Therapy -ITB, glute and hamstring rolling done bilaterally in side lying  -ITB, glute and hamstring rolling done bilaterally in side lying   -assisted contract relax hamstring stretch done bilaterally      -ITB, glute and hamstring rolling done bilaterally in side lying  -assisted contract relax hamstring stretch done bilaterally           -ITB, glute and hamstring rolling done bilaterally in side lying   -assisted contract relax hamstring stretch done bilaterally   -Gastroc and anterior tib strumming done bilaterally      Therapeutic Exercise Minutes 35 35 35 35   Manual Therapy Minutes 8 10 8 10   Total Time Of Timed Procedures 43 45 43 45   Total Time Of Service-Based Procedures 0 0 0 0   Total Treatment Time 43 45 43 45        HEP  Access Code: [email or web address]  URL: [email or web address]  Date: 06/03/2025  Prepared by: Brennen Guzman    Exercises  - Hooklying Hamstring Stretch with Strap  - 1 x daily - 5-7 x weekly - 3 sets - 30-40 hold  - Gastroc Stretch on Wall  - 1 x daily - 5-7 x weekly - 3 sets - 20-30 hold  - Supine Sciatic Nerve Glide  - 5-7 x weekly - 3 sets - 10 reps  - Supine Knees to Chest with Swiss Ball  - 5-6 x weekly - 3 sets - 10 reps  - Supine Lower Trunk Rotation with Swiss Ball  - 5-6 x weekly - 3 sets - 10 reps  - Beginner Bridge  - 3 x weekly - 3 sets - 10  reps    Charges   There Ex(2) MT(1)

## 2025-07-14 ENCOUNTER — TELEPHONE (OUTPATIENT)
Dept: PHYSICAL THERAPY | Facility: HOSPITAL | Age: 80
End: 2025-07-14

## 2025-07-14 ENCOUNTER — APPOINTMENT (OUTPATIENT)
Dept: PHYSICAL THERAPY | Age: 80
End: 2025-07-14
Attending: FAMILY MEDICINE
Payer: COMMERCIAL

## 2025-07-16 ENCOUNTER — OFFICE VISIT (OUTPATIENT)
Dept: PHYSICAL THERAPY | Age: 80
End: 2025-07-16
Attending: FAMILY MEDICINE
Payer: COMMERCIAL

## 2025-07-16 PROCEDURE — 97110 THERAPEUTIC EXERCISES: CPT

## 2025-07-16 PROCEDURE — 97140 MANUAL THERAPY 1/> REGIONS: CPT

## 2025-07-16 NOTE — PROGRESS NOTES
Patient: Celia Bangura (79 year old, male) Referring Provider:  Insurance:   Diagnosis: Neurogenic claudication due to lumbar spinal stenosis (M48.062) Mars Hoskins  [facility]   Date of Surgery: n/a Next MD visit:  N/A   Precautions:  None; Fall Risk unknown Referral Information:    Date of Evaluation: Req: 10, Auth: 10, Exp: 9/30/2025 06/03/25 POC Auth Visits:  10       Today's Date   7/16/2025    Subjective  Pt reports an improvement in his posterior BLE pain following last therapy session w/ minor complaints of soreness in the area of his hips. Pt states he has been completing his HEP w/o any difficulty or pain. Pt denies any LBP at this time. Pt reports some anterior shin pain and gastroc pain after walking downtown for prolonged periods of time.       Pain: 3/10     Objective  Weakness of core and glute musuclture bilaterally. Soft tissue restrictions throughtout posterior chain bilaterally. Improvements in forward flexion following exercise prescription. Improvement in pain levels since eval 7 to now 3 at worst    Lack of knee extension bilaterally R worse than L        Assessment  Pt responded well to skilled therapy session w/ reports of an improvement in posterior leg pain following exercise and manual prescription. Pt was progressed in CKC exercises that focused on BLE and core strengthening due to deficits.Pt also was progressed in knee mobilizations that focused on extension due to deficits bilaterally. Pt will continue to benefit from skilled therapy to improve lumbopelvic stability and overall activity tolerance.    Goals (to be met in 10 visits)      Not Met Progress Toward Partially Met Met   Pt will improve transversus abdominis recruitment to perform proper isometric contraction without requiring verbal or tactile cuing to promote advancement of therex. [] [] [] []   Pt will demonstrate good understanding of proper posture and body mechanics to decrease pain and improve spinal safety. [] [] [] []    Pt will report improved symptom centralization and absence of radicular symptoms for 3 consecutive days to improve function with ADLs. [] [] [] []   Pt will have decreased paraspinal and posterior chain mm tension to tolerate standing >35 minutes for work and home activities. [] [] [] []   Pt will demonstrate improved core strength to be able to perform transitional movements and prolonged standing with <3/10 pain. [] [] [] []   Pt will be independent and compliant with comprehensive HEP to maintain progress achieved in PT [] [] [] []                           Plan  Progress per POC focus on SLS, core and glute strengthening.    Treatment Last 4 Visits  Treatment Day: 6 6/18/2025 7/2/2025 7/7/2025 7/16/2025   Spine Treatment   Therapeutic Exercise -NuStep lvl 3 x6 min  -gastroc stretch in standing using slant board 3x30 sec holds  -DL shuttle squats lvl 5 3x10  -B knee to chest + LTR 2x12 each  -Hook-lying hip abduction using GTB 2x10 w/ 2 sec holds  -Hook-lying hip add ball squeezes 2x10 w/ 3 sec holds  -PT assisted hamstring stretch w/ contract relax x5 each side  -partial bridge on exercise ball 2x10  -Standing hip ABD + Ext 2x10 per leg w/ BUE assist         -NuStep lvl 3 x6 min   -gastroc stretch in standing using slant board 3x30 sec holds   -DL shuttle squats lvl 5 3x10   -B knee to chest + LTR 2x12 each   -Hook-lying hip abduction using GTB 2x10 w/ 2 sec holds   -Hook-lying hip add ball squeezes 2x10 w/ 3 sec holds   -PT assisted hamstring stretch w/ contract relax x5 each side   -partial bridge on exercise ball 2x10   -Standing hip ABD + Ext 2x10 per leg w/ BUE assist      -NuStep lvl 3 x6 min   -gastroc stretch in standing using slant board 3x30 sec holds   -DL shuttle squats lvl 5 3x10   -B knee to chest + LTR 2x12 each   -Hook-lying hip abduction using GTB 2x10 w/ 2 sec holds   -Hook-lying hip add ball squeezes 2x10 w/ 3 sec holds   -PT assisted hamstring stretch w/ contract relax x5 each side    -partial bridge on exercise ball 2x10   -Standing hip ABD + Ext 2x10 per leg w/ BUE assist    -NuStep lvl 3 x6 min   -gastroc stretch in standing using slant board 3x30 sec holds   -DL shuttle squats lvl 5 3x10   -B knee to chest + LTR 2x12 each   -Hook-lying hip abduction using GTB 2x10 w/ 2 sec holds   -Hook-lying hip add ball squeezes 2x10 w/ 3 sec holds   -PT assisted hamstring stretch w/ contract relax x5 each side   -partial bridge on exercise ball 2x10   -Standing hip ABD + Ext 2x10 per leg w/ BUE assist      Manual Therapy -ITB, glute and hamstring rolling done bilaterally in side lying   -assisted contract relax hamstring stretch done bilaterally      -ITB, glute and hamstring rolling done bilaterally in side lying  -assisted contract relax hamstring stretch done bilaterally           -ITB, glute and hamstring rolling done bilaterally in side lying   -assisted contract relax hamstring stretch done bilaterally   -Gastroc and anterior tib strumming done bilaterally    -ITB, glute and hamstring rolling done bilaterally in side lying   -assisted contract relax hamstring stretch done bilaterally   -Gastroc and anterior tib strumming done bilaterally   -flexion and extension knee mobilization done bilaterally    Therapeutic Exercise Minutes 35 35 35 35   Manual Therapy Minutes 10 8 10 10   Total Time Of Timed Procedures 45 43 45 45   Total Time Of Service-Based Procedures 0 0 0 0   Total Treatment Time 45 43 45 45        HEP  Access Code: [email or web address]  URL: [email or web address]  Date: 06/03/2025  Prepared by: Brennen Guzman    Exercises  - Hooklying Hamstring Stretch with Strap  - 1 x daily - 5-7 x weekly - 3 sets - 30-40 hold  - Gastroc Stretch on Wall  - 1 x daily - 5-7 x weekly - 3 sets - 20-30 hold  - Supine Sciatic Nerve Glide  - 5-7 x weekly - 3 sets - 10 reps  - Supine Knees to Chest with Swiss Ball  - 5-6 x weekly - 3 sets - 10 reps  - Supine Lower Trunk Rotation with Swiss Ball  - 5-6 x  weekly - 3 sets - 10 reps  - Beginner Bridge  - 3 x weekly - 3 sets - 10 reps    Charges  There Ex(2) MT(1)

## 2025-07-21 ENCOUNTER — OFFICE VISIT (OUTPATIENT)
Dept: PHYSICAL THERAPY | Age: 80
End: 2025-07-21
Attending: FAMILY MEDICINE
Payer: COMMERCIAL

## 2025-07-21 PROCEDURE — 97110 THERAPEUTIC EXERCISES: CPT

## 2025-07-21 PROCEDURE — 97140 MANUAL THERAPY 1/> REGIONS: CPT

## 2025-07-21 NOTE — PROGRESS NOTES
Patient: Celia Bangura (79 year old, male) Referring Provider:  Insurance:   Diagnosis: Neurogenic claudication due to lumbar spinal stenosis (M48.062) Mars Hoskins  [facility]   Date of Surgery: n/a Next MD visit:  N/A   Precautions:  None; Fall Risk unknown Referral Information:    Date of Evaluation: Req: 10, Auth: 10, Exp: 9/30/2025 06/03/25 POC Auth Visits:  10       Today's Date   7/21/2025    Subjective  Pt reports an improvement in his posterior BLE pain following last therapy session. Pt states that he is experiencing more discomfort on the R knee and popliteal space. Pt states he has been completing his HEP w/o any difficulty or pain. Pt also walks on a regular basis from 40 to 90 minutes w/ minimal aggregation. Pt denies any LBP at this time. Pt reports some anterior shin pain and gastroc pain after walking downtown for prolonged periods of time.       Pain: 3/10     Objective  Weakness of core and glute musuclture bilaterally. Soft tissue restrictions throughtout posterior chain bilaterally. Improvements in forward flexion following exercise prescription. Improvement in pain levels since eval 7 to now 3 at worst    Lack of knee extension more pronounced on the R as well as some difficulty with SLS due to balance difficulties.         Assessment  Pt responded well to skilled therapy session w/ reports of an improvement in posterior leg pain following exercise and manual prescriuption. Pt was progressed in CKC exercises that focused on BLE and core strengthening due to deficits. Pt will continue to benefit from skilled therapy to improve lumbopelvic stability and overall activity tolerance. Pt was reminded importance of HEP completion and consistency in order to maximize overall progress. Pt will be away from therapy for a couple weeks due to some scheduling conflicts but he and his daughter(helps make sure completed correctly) plan to complete his HEP.     Goals (to be met in 10 visits)      Not Met  Progress Toward Partially Met Met   Pt will improve transversus abdominis recruitment to perform proper isometric contraction without requiring verbal or tactile cuing to promote advancement of therex. [] [] [] []   Pt will demonstrate good understanding of proper posture and body mechanics to decrease pain and improve spinal safety. [] [] [] []   Pt will report improved symptom centralization and absence of radicular symptoms for 3 consecutive days to improve function with ADLs. [] [] [] []   Pt will have decreased paraspinal and posterior chain mm tension to tolerate standing >35 minutes for work and home activities. [] [] [] []   Pt will demonstrate improved core strength to be able to perform transitional movements and prolonged standing with <3/10 pain. [] [] [] []   Pt will be independent and compliant with comprehensive HEP to maintain progress achieved in PT [] [] [] []                               Plan  Progress per POC focus on SLS, core and glute strengthening.    Treatment Last 4 Visits  Treatment Day: 6 7/2/2025 7/7/2025 7/16/2025 7/21/2025   Spine Treatment   Therapeutic Exercise -NuStep lvl 3 x6 min   -gastroc stretch in standing using slant board 3x30 sec holds   -DL shuttle squats lvl 5 3x10   -B knee to chest + LTR 2x12 each   -Hook-lying hip abduction using GTB 2x10 w/ 2 sec holds   -Hook-lying hip add ball squeezes 2x10 w/ 3 sec holds   -PT assisted hamstring stretch w/ contract relax x5 each side   -partial bridge on exercise ball 2x10   -Standing hip ABD + Ext 2x10 per leg w/ BUE assist      -NuStep lvl 3 x6 min   -gastroc stretch in standing using slant board 3x30 sec holds   -DL shuttle squats lvl 5 3x10   -B knee to chest + LTR 2x12 each   -Hook-lying hip abduction using GTB 2x10 w/ 2 sec holds   -Hook-lying hip add ball squeezes 2x10 w/ 3 sec holds   -PT assisted hamstring stretch w/ contract relax x5 each side   -partial bridge on exercise ball 2x10   -Standing hip ABD + Ext 2x10  per leg w/ BUE assist    -NuStep lvl 3 x6 min   -gastroc stretch in standing using slant board 3x30 sec holds   -DL shuttle squats lvl 5 3x10   -B knee to chest + LTR 2x12 each   -Hook-lying hip abduction using GTB 2x10 w/ 2 sec holds   -Hook-lying hip add ball squeezes 2x10 w/ 3 sec holds   -PT assisted hamstring stretch w/ contract relax x5 each side   -partial bridge on exercise ball 2x10   -Standing hip ABD + Ext 2x10 per leg w/ BUE assist    -NuStep lvl 3 x6 min  -Standing hamstring stretch in stairs x10 w/ 5 sec holds each side   -gastroc stretch in standing using slant board 3x30 sec holds   -Soleus stretch in standing w/ slight DF mobilization 2x10 per side   -standing hamstring curls using RTB 2x10 BUE assist   -forward lunge to bosu w/ BUE assist 2x10 per side  -attempted lateral step up w/ eccentric lowering but deferred due to increase in R side knee pain  -PT assisted hamstring stretch w/ contract relax x5 each side        Manual Therapy -ITB, glute and hamstring rolling done bilaterally in side lying  -assisted contract relax hamstring stretch done bilaterally           -ITB, glute and hamstring rolling done bilaterally in side lying   -assisted contract relax hamstring stretch done bilaterally   -Gastroc and anterior tib strumming done bilaterally    -ITB, glute and hamstring rolling done bilaterally in side lying   -assisted contract relax hamstring stretch done bilaterally   -Gastroc and anterior tib strumming done bilaterally   -flexion and extension knee mobilization done bilaterally  -hamstring strumming distal attachments new knee in side-lying done on the R  -assisted contract relax hamstring stretch done bilaterally x5  -flexion at EOB w/ traction and extension knee mobilization(femur IR bias) grade ll/lll  -Tibial IR and ER at EOB mobilization on the R.     Therapeutic Exercise Minutes 35 35 35 35   Manual Therapy Minutes 8 10 10 10   Total Time Of Timed Procedures 43 45 45 45   Total Time Of  Service-Based Procedures 0 0 0 0   Total Treatment Time 43 45 45 45        HEP  Access Code: [email or web address]  URL: [email or web address]  Date: 06/03/2025  Prepared by: Brennen Guzman    Exercises  - Hooklying Hamstring Stretch with Strap  - 1 x daily - 5-7 x weekly - 3 sets - 30-40 hold  - Gastroc Stretch on Wall  - 1 x daily - 5-7 x weekly - 3 sets - 20-30 hold  - Supine Sciatic Nerve Glide  - 5-7 x weekly - 3 sets - 10 reps  - Supine Knees to Chest with Swiss Ball  - 5-6 x weekly - 3 sets - 10 reps  - Supine Lower Trunk Rotation with Swiss Ball  - 5-6 x weekly - 3 sets - 10 reps  - Beginner Bridge  - 3 x weekly - 3 sets - 10 reps    Charges  There Ex(2) MT(1)

## 2025-08-05 ENCOUNTER — APPOINTMENT (OUTPATIENT)
Dept: PHYSICAL THERAPY | Age: 80
End: 2025-08-05
Attending: FAMILY MEDICINE

## 2025-08-07 ENCOUNTER — OFFICE VISIT (OUTPATIENT)
Dept: PHYSICAL THERAPY | Age: 80
End: 2025-08-07
Attending: FAMILY MEDICINE

## 2025-08-07 PROCEDURE — 97110 THERAPEUTIC EXERCISES: CPT

## 2025-08-07 PROCEDURE — 97140 MANUAL THERAPY 1/> REGIONS: CPT

## 2025-08-11 ENCOUNTER — OFFICE VISIT (OUTPATIENT)
Dept: PHYSICAL THERAPY | Age: 80
End: 2025-08-11
Attending: FAMILY MEDICINE

## 2025-08-11 PROCEDURE — 97110 THERAPEUTIC EXERCISES: CPT

## 2025-08-11 PROCEDURE — 97140 MANUAL THERAPY 1/> REGIONS: CPT

## 2025-08-13 ENCOUNTER — OFFICE VISIT (OUTPATIENT)
Dept: PHYSICAL THERAPY | Age: 80
End: 2025-08-13
Attending: FAMILY MEDICINE

## 2025-08-13 PROCEDURE — 97140 MANUAL THERAPY 1/> REGIONS: CPT

## 2025-08-13 PROCEDURE — 97110 THERAPEUTIC EXERCISES: CPT

## 2025-08-15 ENCOUNTER — TELEPHONE (OUTPATIENT)
Dept: PHYSICAL THERAPY | Facility: HOSPITAL | Age: 80
End: 2025-08-15

## 2025-08-28 ENCOUNTER — OFFICE VISIT (OUTPATIENT)
Dept: FAMILY MEDICINE CLINIC | Facility: CLINIC | Age: 80
End: 2025-08-28

## 2025-08-28 VITALS
RESPIRATION RATE: 18 BRPM | OXYGEN SATURATION: 98 % | SYSTOLIC BLOOD PRESSURE: 130 MMHG | BODY MASS INDEX: 23.74 KG/M2 | TEMPERATURE: 98 F | HEIGHT: 69.5 IN | DIASTOLIC BLOOD PRESSURE: 70 MMHG | WEIGHT: 164 LBS | HEART RATE: 71 BPM

## 2025-08-28 DIAGNOSIS — M48.062 NEUROGENIC CLAUDICATION DUE TO LUMBAR SPINAL STENOSIS: Primary | ICD-10-CM

## 2025-08-28 DIAGNOSIS — E55.9 VITAMIN D DEFICIENCY: ICD-10-CM

## 2025-08-28 DIAGNOSIS — R73.01 IFG (IMPAIRED FASTING GLUCOSE): ICD-10-CM

## 2025-08-28 PROCEDURE — 99214 OFFICE O/P EST MOD 30 MIN: CPT | Performed by: FAMILY MEDICINE

## 2025-08-28 PROCEDURE — 3078F DIAST BP <80 MM HG: CPT | Performed by: FAMILY MEDICINE

## 2025-08-28 PROCEDURE — 3008F BODY MASS INDEX DOCD: CPT | Performed by: FAMILY MEDICINE

## 2025-08-28 PROCEDURE — 3075F SYST BP GE 130 - 139MM HG: CPT | Performed by: FAMILY MEDICINE

## 2025-08-28 PROCEDURE — G2211 COMPLEX E/M VISIT ADD ON: HCPCS | Performed by: FAMILY MEDICINE

## 2025-08-29 ENCOUNTER — LAB ENCOUNTER (OUTPATIENT)
Dept: LAB | Age: 80
End: 2025-08-29
Attending: FAMILY MEDICINE

## 2025-08-29 DIAGNOSIS — R73.01 IFG (IMPAIRED FASTING GLUCOSE): ICD-10-CM

## 2025-08-29 DIAGNOSIS — E55.9 VITAMIN D DEFICIENCY: ICD-10-CM

## 2025-08-29 LAB
ALBUMIN SERPL-MCNC: 4.4 G/DL (ref 3.2–4.8)
ALBUMIN/GLOB SERPL: 1.5 (ref 1–2)
ALP LIVER SERPL-CCNC: 61 U/L (ref 45–117)
ALT SERPL-CCNC: 20 U/L (ref 10–49)
ANION GAP SERPL CALC-SCNC: 12 MMOL/L (ref 0–18)
AST SERPL-CCNC: 25 U/L (ref ?–34)
BASOPHILS # BLD AUTO: 0.04 X10(3) UL (ref 0–0.2)
BASOPHILS NFR BLD AUTO: 0.6 %
BILIRUB SERPL-MCNC: 0.5 MG/DL (ref 0.2–1.1)
BUN BLD-MCNC: 17 MG/DL (ref 9–23)
CALCIUM BLD-MCNC: 9.7 MG/DL (ref 8.7–10.6)
CHLORIDE SERPL-SCNC: 106 MMOL/L (ref 98–112)
CHOLEST SERPL-MCNC: 148 MG/DL (ref ?–200)
CO2 SERPL-SCNC: 26 MMOL/L (ref 21–32)
CREAT BLD-MCNC: 1.09 MG/DL (ref 0.7–1.3)
EGFRCR SERPLBLD CKD-EPI 2021: 69 ML/MIN/1.73M2 (ref 60–?)
EOSINOPHIL # BLD AUTO: 0.16 X10(3) UL (ref 0–0.7)
EOSINOPHIL NFR BLD AUTO: 2.2 %
ERYTHROCYTE [DISTWIDTH] IN BLOOD BY AUTOMATED COUNT: 13.9 %
EST. AVERAGE GLUCOSE BLD GHB EST-MCNC: 134 MG/DL (ref 68–126)
FASTING PATIENT LIPID ANSWER: YES
FASTING STATUS PATIENT QL REPORTED: YES
GLOBULIN PLAS-MCNC: 3 G/DL (ref 2–3.5)
GLUCOSE BLD-MCNC: 102 MG/DL (ref 70–99)
HBA1C MFR BLD: 6.3 % (ref ?–5.7)
HCT VFR BLD AUTO: 40.9 % (ref 39–53)
HDLC SERPL-MCNC: 37 MG/DL (ref 40–59)
HGB BLD-MCNC: 12.7 G/DL (ref 13–17.5)
IMM GRANULOCYTES # BLD AUTO: 0.01 X10(3) UL (ref 0–1)
IMM GRANULOCYTES NFR BLD: 0.1 %
LDLC SERPL CALC-MCNC: 89 MG/DL (ref ?–100)
LYMPHOCYTES # BLD AUTO: 3.64 X10(3) UL (ref 1–4)
LYMPHOCYTES NFR BLD AUTO: 50.3 %
MCH RBC QN AUTO: 28 PG (ref 26–34)
MCHC RBC AUTO-ENTMCNC: 31.1 G/DL (ref 31–37)
MCV RBC AUTO: 90.1 FL (ref 80–100)
MONOCYTES # BLD AUTO: 0.67 X10(3) UL (ref 0.1–1)
MONOCYTES NFR BLD AUTO: 9.3 %
NEUTROPHILS # BLD AUTO: 2.72 X10 (3) UL (ref 1.5–7.7)
NEUTROPHILS # BLD AUTO: 2.72 X10(3) UL (ref 1.5–7.7)
NEUTROPHILS NFR BLD AUTO: 37.5 %
NONHDLC SERPL-MCNC: 111 MG/DL (ref ?–130)
OSMOLALITY SERPL CALC.SUM OF ELEC: 300 MOSM/KG (ref 275–295)
PLATELET # BLD AUTO: 224 10(3)UL (ref 150–450)
POTASSIUM SERPL-SCNC: 4.5 MMOL/L (ref 3.5–5.1)
PROT SERPL-MCNC: 7.4 G/DL (ref 5.7–8.2)
RBC # BLD AUTO: 4.54 X10(6)UL (ref 3.8–5.8)
SODIUM SERPL-SCNC: 144 MMOL/L (ref 136–145)
T4 FREE SERPL-MCNC: 1.1 NG/DL (ref 0.8–1.7)
TRIGL SERPL-MCNC: 121 MG/DL (ref 30–149)
TSI SER-ACNC: 1.67 UIU/ML (ref 0.55–4.78)
VIT D+METAB SERPL-MCNC: 31 NG/ML (ref 30–100)
VLDLC SERPL CALC-MCNC: 20 MG/DL (ref 0–30)
WBC # BLD AUTO: 7.2 X10(3) UL (ref 4–11)

## 2025-08-29 PROCEDURE — 36415 COLL VENOUS BLD VENIPUNCTURE: CPT | Performed by: FAMILY MEDICINE

## 2025-08-29 PROCEDURE — 83036 HEMOGLOBIN GLYCOSYLATED A1C: CPT

## 2025-08-29 PROCEDURE — 80061 LIPID PANEL: CPT | Performed by: FAMILY MEDICINE

## 2025-08-29 PROCEDURE — 80053 COMPREHEN METABOLIC PANEL: CPT | Performed by: FAMILY MEDICINE

## 2025-08-29 PROCEDURE — 84443 ASSAY THYROID STIM HORMONE: CPT | Performed by: FAMILY MEDICINE

## 2025-08-29 PROCEDURE — 82306 VITAMIN D 25 HYDROXY: CPT

## 2025-08-29 PROCEDURE — 84439 ASSAY OF FREE THYROXINE: CPT | Performed by: FAMILY MEDICINE

## 2025-08-29 PROCEDURE — 85025 COMPLETE CBC W/AUTO DIFF WBC: CPT | Performed by: FAMILY MEDICINE

## 2025-09-01 RX ORDER — ATORVASTATIN CALCIUM 40 MG/1
40 TABLET, FILM COATED ORAL NIGHTLY
Qty: 90 TABLET | Refills: 3 | Status: SHIPPED | OUTPATIENT
Start: 2025-09-01

## 2025-09-01 RX ORDER — CLOPIDOGREL BISULFATE 75 MG/1
75 TABLET ORAL DAILY
Qty: 90 TABLET | Refills: 3 | Status: SHIPPED | OUTPATIENT
Start: 2025-09-01

## (undated) NOTE — Clinical Note
ROYAL Herrera completed TCM. Patient has upcoming TCM/HFU appointment scheduled on 12/28/22. Thank you.